# Patient Record
Sex: MALE | Race: WHITE | Employment: OTHER | URBAN - METROPOLITAN AREA
[De-identification: names, ages, dates, MRNs, and addresses within clinical notes are randomized per-mention and may not be internally consistent; named-entity substitution may affect disease eponyms.]

---

## 2017-01-27 ENCOUNTER — HOSPITAL ENCOUNTER (OUTPATIENT)
Dept: LAB | Age: 32
Discharge: HOME | End: 2017-01-27

## 2017-01-27 ENCOUNTER — OFFICE VISIT (OUTPATIENT)
Dept: INTERNAL MEDICINE CLINIC | Age: 32
End: 2017-01-27

## 2017-01-27 VITALS
BODY MASS INDEX: 35.78 KG/M2 | OXYGEN SATURATION: 99 % | DIASTOLIC BLOOD PRESSURE: 74 MMHG | RESPIRATION RATE: 16 BRPM | HEART RATE: 78 BPM | WEIGHT: 270 LBS | SYSTOLIC BLOOD PRESSURE: 127 MMHG | TEMPERATURE: 98.3 F | HEIGHT: 73 IN

## 2017-01-27 DIAGNOSIS — I48.0 PAROXYSMAL ATRIAL FIBRILLATION (HCC): Primary | ICD-10-CM

## 2017-01-27 DIAGNOSIS — J06.9 UPPER RESPIRATORY TRACT INFECTION, UNSPECIFIED TYPE: ICD-10-CM

## 2017-01-27 DIAGNOSIS — G47.9 SLEEP DISORDER: ICD-10-CM

## 2017-01-27 DIAGNOSIS — G89.29 CHRONIC MIDLINE LOW BACK PAIN WITHOUT SCIATICA: ICD-10-CM

## 2017-01-27 DIAGNOSIS — M54.50 CHRONIC MIDLINE LOW BACK PAIN WITHOUT SCIATICA: ICD-10-CM

## 2017-01-27 DIAGNOSIS — E66.9 OBESITY (BMI 30-39.9): ICD-10-CM

## 2017-01-27 PROCEDURE — 99001 SPECIMEN HANDLING PT-LAB: CPT | Performed by: INTERNAL MEDICINE

## 2017-01-27 RX ORDER — NAPROXEN 500 MG/1
500 TABLET ORAL 2 TIMES DAILY WITH MEALS
Qty: 60 TAB | Refills: 1 | Status: SHIPPED | OUTPATIENT
Start: 2017-01-27 | End: 2017-07-27

## 2017-01-27 RX ORDER — ALBUTEROL SULFATE 90 UG/1
2 AEROSOL, METERED RESPIRATORY (INHALATION)
Qty: 1 INHALER | Refills: 0 | Status: SHIPPED | OUTPATIENT
Start: 2017-01-27 | End: 2017-07-27

## 2017-01-27 NOTE — PROGRESS NOTES
Pt presented today to establish care, pt would like a referral for sleep study, pt also reports back pain. Has pt had any falls since last visit? No.  Pt preferred language for health care discussion is english. Advanced Directive? No    Is someone accompanying this pt? No    Is the patient using any DME equipment during OV? No      1. Have you been to the ER, urgent care clinic since your last visit? Hospitalized since your last visit? No    2. Have you seen or consulted any other health care providers outside of the [facility] since your last visit? Include any pap smears or colon screening. No      Pt has a reminder for a \"due or due soon\" health maintenance. I have asked that he contact his primary care provider for follow-up on this health maintenance.

## 2017-01-27 NOTE — MR AVS SNAPSHOT
Visit Information Date & Time Provider Department Dept. Phone Encounter #  
 1/27/2017 12:45 [address] [phone] [id number] Follow-up Instructions Return in about 6 months (around 7/27/2017), or if symptoms worsen or fail to improve. Upcoming Health Maintenance Date Due DTaP/Tdap/Td series (1 - Tdap) 1/5/2007 INFLUENZA AGE 9 TO ADULT 8/1/2016 Allergies as of 1/27/2017  Review Complete On: 1/27/2017 By: Ebonie Whitlock MD  
 No Known Allergies Current Immunizations  Never Reviewed No immunizations on file. Not reviewed this visit You Were Diagnosed With   
  
 Codes Comments Paroxysmal atrial fibrillation (HCC)    -  Primary ICD-10-CM: I48.0 ICD-9-CM: 427.31 Upper respiratory tract infection, unspecified type     ICD-10-CM: J06.9 ICD-9-CM: 465.9 Sleep disorder     ICD-10-CM: G47.9 ICD-9-CM: 780.50 Obesity (BMI 30-39. 9)     ICD-10-CM: E66.9 ICD-9-CM: 278.00 Chronic midline low back pain without sciatica     ICD-10-CM: M54.5, G89.29 ICD-9-CM: 724.2, 338.29 Vitals BP Pulse Temp Resp Height(growth percentile) Weight(growth percentile) 127/74 (BP 1 Location: Left arm, BP Patient Position: Sitting) 78 98.3 °F (36.8 °C) (Oral) 16 6' 1\" (1.854 m) 270 lb (122.5 kg) SpO2 BMI Smoking Status 99% 35.62 kg/m2 Never Smoker Vitals History BMI and BSA Data Body Mass Index Body Surface Area  
 35.62 kg/m 2 2.51 m 2 Preferred Pharmacy Pharmacy Name Phone [address] [place] [address] [phone] Your Updated Medication List  
  
   
This list is accurate as of: 1/27/17  1:12 PM.  Always use your most recent med list.  
  
  
  
  
 albuterol 90 mcg/actuation inhaler Commonly known as:  PROVENTIL HFA, VENTOLIN HFA, PROAIR HFA Take 2 Puffs by inhalation every six (6) hours as needed for Wheezing or Shortness of Breath. naproxen 500 mg tablet Commonly known as:  NAPROSYN Take 1 Tab by mouth two (2) times daily (with meals). Indications: PAIN Prescriptions Sent to Pharmacy Refills  
 albuterol (PROVENTIL HFA, VENTOLIN HFA, PROAIR HFA) 90 mcg/actuation inhaler 0 Sig: Take 2 Puffs by inhalation every six (6) hours as needed for Wheezing or Shortness of Breath. Class: Normal  
 Pharmacy: [address], [facility] Ph #: [phone] Route: Inhalation  
 naproxen (NAPROSYN) 500 mg tablet 1 Sig: Take 1 Tab by mouth two (2) times daily (with meals). Indications: PAIN Class: Normal  
 Pharmacy: [address], [address] [place] [address]. Ph #: [phone] Route: Oral  
  
We Performed the Following SLEEP MEDICINE REFERRAL [QGT834 Custom] Comments:  
 Please evaluate patient for possible JESSICA. H/o a fib, obesity, witness apnea. Follow-up Instructions Return in about 6 months (around 7/27/2017), or if symptoms worsen or fail to improve. To-Do List   
 01/27/2017 Lab:  CBC W/O DIFF   
  
 01/27/2017 Lab:  LIPID PANEL   
  
 01/27/2017 Lab:  METABOLIC PANEL, COMPREHENSIVE   
  
 01/27/2017 Lab:  TSH 3RD GENERATION Referral Information Referral ID Referred By Referred To  
  
 [id number] Della TARANGO Not Available Visits Status Start Date End Date 1 New Request 1/27/17 1/27/18 If your referral has a status of pending review or denied, additional information will be sent to support the outcome of this decision. Introducing [facility]! Paulina Bang introduces [employer] patient portal. Now you can access parts of your medical record, email your doctor's office, and request medication refills online. 1. In your internet browser, go to [email or web address]. [email or web address] 2. Click on the First Time User? Click Here link in the Sign In box.  You will see the New Member Sign Up page. 3. Enter your [employer] Access Code exactly as it appears below. You will not need to use this code after youve completed the sign-up process. If you do not sign up before the expiration date, you must request a new code. · [employer] Access Code: [email or web address] Expires: 4/27/2017  1:12 PM 
 
4. Enter the last four digits of your Social Security Number (xxxx) and Date of Birth (mm/dd/yyyy) as indicated and click Submit. You will be taken to the next sign-up page. 5. Create a [employer] ID. This will be your [employer] login ID and cannot be changed, so think of one that is secure and easy to remember. 6. Create a [employer] password. You can change your password at any time. 7. Enter your Password Reset Question and Answer. This can be used at a later time if you forget your password. 8. Enter your e-mail address. You will receive e-mail notification when new information is available in [facility] 9. Click Sign Up. You can now view and download portions of your medical record. 10. Click the Download Summary menu link to download a portable copy of your medical information. If you have questions, please visit the Frequently Asked Questions section of the [employer] website. Remember, [employer] is NOT to be used for urgent needs. For medical emergencies, dial 911. Now available from your iPhone and Android! Please provide this summary of care documentation to your next provider. Your primary care clinician is listed as Kimberly [address]. If you have any questions after today's visit, please call [phone].

## 2017-01-27 NOTE — PROGRESS NOTES
HISTORY OF PRESENT ILLNESS  Demetria Conrad is a 33 y.o. male. HPI Comments: 32 yo male here to establish care. F/u of a fib, possible JESSICA, chronic back pain, recent URI sx. A fib on prior cardiology evaluation. May-June 2016. Noted two separate episodes of significant palpitations. Was briefly on eliquis, ? Diltiazem but was told he did not need to continue due to low risk. Has not had recent palpiations. [facility] cardiologist did recommended he have sleep study due to possible JESSICA sx. [facility] girlfriend has noted apnea episodes. BMI 35. Has been trying to work on increased exercise, dietary changes. Back pain chronic issue but increased recently. No radicular sx. Aleve prn helped some but does not take regularly. Has had recent cough over past few days. No fevers noted. Some sore throat. Establish Care   Associated symptoms include headaches. Pertinent negatives include no chest pain and no shortness of breath. Back Pain    Associated symptoms include headaches. Pertinent negatives include no chest pain, no fever, no weight loss and no tingling. Review of Systems   Constitutional: Negative for chills, fever and weight loss. HENT: Negative for congestion. Eyes: Negative for blurred vision and pain. Respiratory: Positive for cough. Negative for shortness of breath. Cardiovascular: Negative for chest pain, palpitations and leg swelling. Gastrointestinal: Negative for heartburn, nausea and vomiting. Genitourinary: Negative for frequency and urgency. Musculoskeletal: Positive for back pain. Negative for joint pain and myalgias. Skin: Negative for itching and rash. Neurological: Positive for headaches. Negative for dizziness and tingling. Psychiatric/Behavioral: Negative for depression. The patient is not nervous/anxious. Past Medical History   Diagnosis Date    Atrial fibrillation ([facility] [address]      No current outpatient prescriptions on file prior to visit.      No current facility-administered medications on file prior to visit. Social History     Social History    Marital status: SINGLE     Spouse name: N/A    Number of children: N/A    Years of education: N/A     Occupational History    Not on file. Social History Main Topics    Smoking status: Never Smoker    Smokeless tobacco: Never Used    Alcohol use 1.8 oz/week     3 Cans of beer per week    Drug use: No    Sexual activity: Yes     Other Topics Concern    Not on file     Social History Narrative    No narrative on file     Family History   Problem Relation Age of Onset    No Known Problems Mother     Arrhythmia Father     Diabetes Father     Parkinson's Disease Father     Celiac Disease Sister        Visit Vitals    /74 (BP 1 Location: Left arm, BP Patient Position: Sitting)    Pulse 78    Temp 98.3 °F (36.8 °C) (Oral)    Resp 16    Ht 6' 1\" (1.854 m)    Wt 270 lb (122.5 kg)    SpO2 99%    BMI 35.62 kg/m2     Physical Exam   Constitutional: He appears well-developed and well-nourished. No distress. /74 (BP 1 Location: Left arm, BP Patient Position: Sitting)  Pulse 78  Temp 98.3 °F (36.8 °C) (Oral)   Resp 16  Ht 6' 1\" (1.854 m)  Wt 270 lb (122.5 kg)  SpO2 99%  BMI 35.62 kg/m2     HENT:   Right Ear: External ear normal.   Left Ear: Tympanic membrane is injected. Mouth/Throat: Oropharyngeal exudate (on R) present. Eyes: EOM are normal. Right eye exhibits no discharge. Left eye exhibits no discharge. No scleral icterus. Neck: Neck supple. Cardiovascular: Normal rate, regular rhythm and normal heart sounds. Exam reveals no gallop and no friction rub. No murmur heard. Pulmonary/Chest: Effort normal. No respiratory distress. He has wheezes (expiratory on L). He has no rales. Abdominal: Soft. He exhibits no distension. There is no tenderness. There is no rebound and no guarding. Musculoskeletal: He exhibits no edema or tenderness.         Thoracic back: He exhibits no tenderness, no bony tenderness and no swelling. Lumbar back: He exhibits no tenderness, no bony tenderness and no swelling. Lymphadenopathy:     He has cervical adenopathy. Neurological: He is alert. He exhibits normal muscle tone. Skin: Skin is warm and dry. Psychiatric: He has a normal mood and affect. ASSESSMENT and PLAN    ICD-10-CM ICD-9-CM    1. Paroxysmal atrial fibrillation (HCC) I48.0 427.31 TSH 3RD GENERATION      TSH 3RD GENERATION   2. Upper respiratory tract infection, unspecified type J06.9 465.9    3. Sleep disorder G47.9 780.50 SLEEP MEDICINE REFERRAL   4. Obesity (BMI 30-39. 9) E66.9 278.00 CBC W/O DIFF      LIPID PANEL      METABOLIC PANEL, COMPREHENSIVE      CBC W/O DIFF      LIPID PANEL      METABOLIC PANEL, COMPREHENSIVE   5. Chronic midline low back pain without sciatica M54.5 724.2     G89.29 338.29    Will try to obtain records from his cardiologist. Repeat labs today. Will try albuterol for cough associated with his URI (? Exercise induced RAD as child)  Sleep medicine referral entered. Asked that he work on weight loss. Naprosyn BID ordered for back pain. If sx persist or increase despite this, will return to reassess.

## 2017-01-28 LAB
ALBUMIN SERPL-MCNC: 4.6 G/DL (ref 3.5–5.5)
ALBUMIN/GLOB SERPL: 1.8 {RATIO} (ref 1.1–2.5)
ALP SERPL-CCNC: 70 IU/L (ref 39–117)
ALT SERPL-CCNC: 50 IU/L (ref 0–44)
AST SERPL-CCNC: 32 IU/L (ref 0–40)
BILIRUB SERPL-MCNC: 0.3 MG/DL (ref 0–1.2)
BUN SERPL-MCNC: 12 MG/DL (ref 6–20)
BUN/CREAT SERPL: 14 (ref 8–19)
CALCIUM SERPL-MCNC: 9.5 MG/DL (ref 8.7–10.2)
CHLORIDE SERPL-SCNC: 99 MMOL/L (ref 96–106)
CHOLEST SERPL-MCNC: 201 MG/DL (ref 100–199)
CO2 SERPL-SCNC: 24 MMOL/L (ref 18–29)
CREAT SERPL-MCNC: 0.87 MG/DL (ref 0.76–1.27)
ERYTHROCYTE [DISTWIDTH] IN BLOOD BY AUTOMATED COUNT: 13.3 % (ref 12.3–15.4)
GLOBULIN SER CALC-MCNC: 2.6 G/DL (ref 1.5–4.5)
GLUCOSE SERPL-MCNC: 107 MG/DL (ref 65–99)
HCT VFR BLD AUTO: 43.3 % (ref 37.5–51)
HDLC SERPL-MCNC: 34 MG/DL
HGB BLD-MCNC: 14.8 G/DL (ref 12.6–17.7)
INTERPRETATION, 910389: NORMAL
LDLC SERPL CALC-MCNC: 148 MG/DL (ref 0–99)
MCH RBC QN AUTO: 29.5 PG (ref 26.6–33)
MCHC RBC AUTO-ENTMCNC: 34.2 G/DL (ref 31.5–35.7)
MCV RBC AUTO: 86 FL (ref 79–97)
PLATELET # BLD AUTO: 286 X10E3/UL (ref 150–379)
POTASSIUM SERPL-SCNC: 4.2 MMOL/L (ref 3.5–5.2)
PROT SERPL-MCNC: 7.2 G/DL (ref 6–8.5)
RBC # BLD AUTO: 5.01 X10E6/UL (ref 4.14–5.8)
SODIUM SERPL-SCNC: 139 MMOL/L (ref 134–144)
TRIGL SERPL-MCNC: 93 MG/DL (ref 0–149)
TSH SERPL DL<=0.005 MIU/L-ACNC: 2.26 UIU/ML (ref 0.45–4.5)
VLDLC SERPL CALC-MCNC: 19 MG/DL (ref 5–40)
WBC # BLD AUTO: 5.9 X10E3/UL (ref 3.4–10.8)

## 2017-06-07 ENCOUNTER — OFFICE VISIT (OUTPATIENT)
Dept: NEUROLOGY | Age: 32
End: 2017-06-07

## 2017-06-07 VITALS
SYSTOLIC BLOOD PRESSURE: 120 MMHG | BODY MASS INDEX: 33.27 KG/M2 | TEMPERATURE: 98.6 F | OXYGEN SATURATION: 98 % | DIASTOLIC BLOOD PRESSURE: 62 MMHG | HEART RATE: 60 BPM | WEIGHT: 251 LBS | HEIGHT: 73 IN

## 2017-06-07 DIAGNOSIS — G47.00 INSOMNIA W/ SLEEP APNEA: Primary | ICD-10-CM

## 2017-06-07 DIAGNOSIS — G47.30 INSOMNIA W/ SLEEP APNEA: Primary | ICD-10-CM

## 2017-06-07 DIAGNOSIS — I48.0 PAROXYSMAL ATRIAL FIBRILLATION (HCC): ICD-10-CM

## 2017-06-07 DIAGNOSIS — R06.83 SNORING: ICD-10-CM

## 2017-06-07 NOTE — PROGRESS NOTES
[facility]             [address] [facility], [address] [facility], [address] [place] [address]      Rosalinda Almeida is right handed [facility]  male who presents in evaluation of sleep disorder. Patient describes childhood Onset was gradual over many years. Patient describes occasional difficulty initiating and staying asleep but more daytime sleepiness and loud snoring and witnessed apnea he has snored since childhood he is improved over the last several months due to a 17 pound weight loss he does take approximately 2 long naps a week to an hour each. Goes to bed at 11 falls asleep within 20-30 minutes awakens twice to 3 times for up to 10 or more minutes at least once to urinate. He does not feel rested in the morning he wakes usually at 7 AM but at times up to 9 or 10 AM he admits to occasional sleep hallucinations where nodding off while driving but no accidents snoring mouth breathing witnessed apnea rare restless leg syndrome  Ess=6 stop bang 5/8    Current Outpatient Prescriptions:     albuterol (PROVENTIL HFA, VENTOLIN HFA, PROAIR HFA) 90 mcg/actuation inhaler, Take 2 Puffs by inhalation every six (6) hours as needed for Wheezing or Shortness of Breath., Disp: 1 Inhaler, Rfl: 0    naproxen (NAPROSYN) 500 mg tablet, Take 1 Tab by mouth two (2) times daily (with meals). Indications: PAIN, Disp: 60 Tab, Rfl: 1  Past Medical History:   Diagnosis Date    Atrial fibrillation ([facility] [address]      Social History     Social History    Marital status: SINGLE     Spouse name: N/A    Number of children: N/A    Years of education: N/A     Occupational History    Not on file.      Social History Main Topics    Smoking status: Never Smoker    Smokeless tobacco: Never Used    Alcohol use 1.8 oz/week     3 Cans of beer per week      Comment: weekends 3 drinks nightly    Drug use: No    Sexual activity: Yes     Other Topics Concern    Not on file     Social History Narrative Review of Systems:   Constitutional:  lost,  17 lbs. Eyes:  Negative blurred vision  CVS:  Negative chest pain  Pulm:  Positive shortness of breath  GI:  Negative nausea or vomiting  :  Positive nocturia  Musculoskeletal:  Negative significant joint pain at night  Skin:  Negative rashes  Neuro:  Negative dizziness   Psych:  Negative significant mood issues    Sleep Review of Systems: notable for Positive difficulty falling asleep; Positive awakenings at night; Positive percieved regular dreaming; Negative nightmares; Negative early morning headaches; 2 afternoon naps per week; Negative memory problems; Positive concentration issues; Negative history of any automobile or occupational accidents due to daytime drowsiness. Physical Exam    Visit Vitals    /62    Pulse 60    Temp 98.6 °F (37 °C) (Oral)    Ht 6' 1\" (1.854 m)    Wt 113.9 kg (251 lb)    SpO2 98%    BMI 33.12 kg/m2       Neck Circumference: 42 cm      General:  Well defined, nourished, and groomed individual in no acute distress. HEENT: head :at/nc Throat:oropharnynx  Crowding noted Mallampati 4  Neck: Supple, nontender, thyroid within normal limits, no JVD, no bruits, no pain   with resistance to active range of motion. Heart: Regular rate and rhythm, no murmurs, rub, or gallop. Normal S1S2. Lungs:  Clear to auscultation   Musculoskeletal:  Extremities revealed no edema, clubbing or cyanosis. Psych:  Good mood and normal affect    Neurologic Exam    Mental Status: The patient is awake, alert, and oriented x 3. Speech is fluent and memory appears to be intact, both long and short term. No aphasias or apraxias. Cranial Nerves: II - Visual fields are full to confrontation. Funduscopic examination reveals flat disks bilaterally. Pupils are both equal and reactive to light and accommodation. III, IV, VI - Extraocular movements are intact and there is no nystagmus.    V - Facial sensation is intact to pinprick and light touch.  VII - Face is symmetrical.   VIII - Hearing is present. IX, X - Palate rises symmetrically. Gag is present. Tongue is in the midline. Motor: Tone is normal and symmetric. There is no pronator drift present. The strength is intact for all muscle groups tested in all four extremities. Sensory: Sensory examination is intact to pinprick, light touch and position sense testing. Coordination: Finger to nose, heel to shin, fine motor movements are well performed. Gait testing is normal as well as stressed gait testing. Romberg is negative    Reflexes: Symmetrical  plantars are down going    Assessment and Plan  Dara Redd is a 33 y.o. right handed male whose history and physical are consistent with insomnia with reagan. Dara Redd who has risk factors including a fib ,witnessed apnea,snoring? RLS    Nanda Linda was seen today for sleep problem. Diagnoses and all orders for this visit:    Insomnia w/ sleep apnea  -     SLEEP STUDY FULL (99092); Future    Snoring  -     SLEEP STUDY FULL (13443); Future    Paroxysmal atrial fibrillation ([facility] [address]  -     SLEEP STUDY FULL (43264); Future    BMI 33.0-33.9,adult  -     SLEEP STUDY FULL (93439); Future      Follow-up Disposition:  Return in about 1 month (around 7/7/2017). Reviewed work up planned ,no driving drowsyI spent 50 minutes with the patient in face-to-face consultation, of which greater than 50% was spent in counseling and coordination of care as described above.         Electronically Signed by:  Liz Sheridan MD

## 2017-06-07 NOTE — MR AVS SNAPSHOT
Visit Information Date & Time Provider Department Dept. Phone Encounter #  
 6/7/2017  2:00 PM Debi Lugo, [address] [phone] [id number] Follow-up Instructions Return in about 1 month (around 7/7/2017). Follow-up and Disposition History Your Appointments 7/27/2017 11:15 AM  
Office Visit with MD RADHA [facility]) Appt Note: 6 month f/u  
 [address]  
  
   
 [address] [facility] Upcoming Health Maintenance Date Due DTaP/Tdap/Td series (1 - Tdap) 1/5/2007 INFLUENZA AGE 9 TO ADULT 8/1/2017 Allergies as of 6/7/2017  Review Complete On: 6/7/2017 By: Debi Lugo MD  
 No Known Allergies Current Immunizations  Never Reviewed No immunizations on file. Not reviewed this visit You Were Diagnosed With   
  
 Codes Comments Insomnia w/ sleep apnea    -  Primary ICD-10-CM: G47.00, G47.30 ICD-9-CM: 780.51 Snoring     ICD-10-CM: R06.83 
ICD-9-CM: 786.09 Paroxysmal atrial fibrillation (HCC)     ICD-10-CM: I48.0 ICD-9-CM: 427.31   
 BMI 33.0-33.9,adult     ICD-10-CM: C55.32 
ICD-9-CM: V85.33 Vitals BP Pulse Temp Height(growth percentile) Weight(growth percentile) SpO2  
 120/62 60 98.6 °F (37 °C) (Oral) 6' 1\" (1.854 m) 251 lb (113.9 kg) 98% BMI Smoking Status 33.12 kg/m2 Never Smoker BMI and BSA Data Body Mass Index Body Surface Area  
 33.12 kg/m 2 2.42 m 2 Preferred Pharmacy Pharmacy Name Phone [address] [facility], [address] [place] [address] [phone] Your Updated Medication List  
  
   
This list is accurate as of: 6/7/17  3:14 PM.  Always use your most recent med list.  
  
  
  
  
 albuterol 90 mcg/actuation inhaler Commonly known as:  PROVENTIL HFA, VENTOLIN HFA, PROAIR HFA  
 Take 2 Puffs by inhalation every six (6) hours as needed for Wheezing or Shortness of Breath. naproxen 500 mg tablet Commonly known as:  NAPROSYN Take 1 Tab by mouth two (2) times daily (with meals). Indications: PAIN Follow-up Instructions Return in about 1 month (around 7/7/2017). To-Do List   
 06/07/2017 Sleep Center:  SLEEP STUDY FULL Patient Instructions No driving drowsy Introducing [facility]! Dear Michelle Marquez: Thank you for requesting a [employer] account. Our records indicate that you already have an active [employer] account. You can access your account anytime at [email or web address] Did you know that you can access your hospital and ER discharge instructions at any time in [employer]? You can also review all of your test results from your hospital stay or ER visit. Additional Information If you have questions, please visit the Frequently Asked Questions section of the [employer] website at [email or web address] Remember, [employer] is NOT to be used for urgent needs. For medical emergencies, dial 911. Now available from your iPhone and Android! Please provide this summary of care documentation to your next provider. Your primary care clinician is listed as Kimberly Damon. If you have any questions after today's visit, please call [phone].

## 2017-06-22 ENCOUNTER — HOSPITAL ENCOUNTER (OUTPATIENT)
Dept: SLEEP MEDICINE | Age: 32
Discharge: HOME | End: 2017-06-22
Payer: COMMERCIAL

## 2017-06-22 DIAGNOSIS — G47.00 INSOMNIA W/ SLEEP APNEA: ICD-10-CM

## 2017-06-22 DIAGNOSIS — R06.83 SNORING: ICD-10-CM

## 2017-06-22 DIAGNOSIS — G47.30 INSOMNIA W/ SLEEP APNEA: ICD-10-CM

## 2017-06-22 DIAGNOSIS — I48.0 PAROXYSMAL ATRIAL FIBRILLATION (HCC): ICD-10-CM

## 2017-06-22 PROCEDURE — 95810 POLYSOM 6/> YRS 4/> PARAM: CPT

## 2017-06-23 VITALS — DIASTOLIC BLOOD PRESSURE: 86 MMHG | HEART RATE: 62 BPM | SYSTOLIC BLOOD PRESSURE: 133 MMHG

## 2017-06-29 NOTE — PROCEDURES
[address]  POLYSOMNOGRAM    Name:  Jennifer Carlos  MR#:  [id number]  :  1985  Account #:  [de-identified]  Date of Adm:  2017  Date of Service:  2017      PROCEDURE:  Polysomnographic study. REFERRING PHYSICIAN:  Nikunj Waldron. Roberta Jo MD    INTERRUPTING/READING PHYSICIAN:  John Tyler. Yoseph Pritchard MD    STUDY:  This is a 33year old with BMI of 33.8 who presents for  polysomnographic study due to suspected sleep apnea. He  reports [place] Sleepiness Score of 8. STOP-Band score was 6/8. Occasional PVCs were noted. He reports being on no medication. He  did not take sedation prior to study. He denies sleep hygiene protocol  violations. Blood pressure prior to study 134/78, post-study 133/86. Study was performed as a diagnostic study. Total recording time was  486 minutes, total sleep time 314, for a decreased sleep efficiency of  74%. This was in large part due to prolonged wake time after sleep  onset of 114 minutes. Sleep latency was normal at 12.5  minutes. REM latency was prolonged at 280 minutes. On arousal  index it was elevated at 16, primarily nonspecific arousals. On review  of sleep architecture, he did enter into all stages of sleep. On  respiratory review, he had 1 obstructive apnea, he had 25 hypopneas. Overall apnea/hypopnea index was in the mild range at 5, however,  became moderately severe in REM at 27. Supine sleep  apnea/hypopnea index was 5 as well. Snoring was much worse  supine where he slept the majority of time. On review of oxygen  saturation, mean oxygen saturation in non-REM was 94, in REM was  93; minimum was 74 in non-REM and 80 in REM. He spent 2.7  minutes below 88% O2 saturation. Mean heart rate was 63 in non-  REM, 64 in REM; minimum heart rate was 45 in non-REM, 46 in REM;  maximum was 97 in non-REM, 85 in REM. Periodic movements of  sleep index was 2.7. Periodic movements of sleep with arousal index  was 0.     CONCLUSION:  The patient's polysomnographic study showed  evidence of overall mild obstructive sleep apnea, which became  moderately severe in REM sleep with oxygen desaturations down to  74%. PVCs were also noted. Suggest repeat study with CPAP. The  patient should not drive if drowsy.         MD Giovana Denis / Gio Myrick  D:  06/28/2017   15:46  T:  06/29/2017   13:27  Job #:  [id number]

## 2017-07-27 ENCOUNTER — OFFICE VISIT (OUTPATIENT)
Dept: INTERNAL MEDICINE CLINIC | Age: 32
End: 2017-07-27

## 2017-07-27 ENCOUNTER — OFFICE VISIT (OUTPATIENT)
Dept: NEUROLOGY | Age: 32
End: 2017-07-27

## 2017-07-27 VITALS
TEMPERATURE: 98.2 F | SYSTOLIC BLOOD PRESSURE: 110 MMHG | DIASTOLIC BLOOD PRESSURE: 72 MMHG | BODY MASS INDEX: 33.9 KG/M2 | HEIGHT: 73 IN | WEIGHT: 255.8 LBS | OXYGEN SATURATION: 98 % | HEART RATE: 71 BPM

## 2017-07-27 VITALS
OXYGEN SATURATION: 99 % | DIASTOLIC BLOOD PRESSURE: 82 MMHG | TEMPERATURE: 98.3 F | WEIGHT: 253.6 LBS | BODY MASS INDEX: 33.61 KG/M2 | SYSTOLIC BLOOD PRESSURE: 135 MMHG | HEIGHT: 73 IN | HEART RATE: 52 BPM | RESPIRATION RATE: 16 BRPM

## 2017-07-27 DIAGNOSIS — I48.0 PAROXYSMAL ATRIAL FIBRILLATION (HCC): Primary | ICD-10-CM

## 2017-07-27 DIAGNOSIS — G47.33 OSA (OBSTRUCTIVE SLEEP APNEA): Primary | ICD-10-CM

## 2017-07-27 DIAGNOSIS — G47.10 HYPERSOMNIA WITH SLEEP APNEA: ICD-10-CM

## 2017-07-27 DIAGNOSIS — Z23 ENCOUNTER FOR IMMUNIZATION: ICD-10-CM

## 2017-07-27 DIAGNOSIS — G47.30 INSOMNIA W/ SLEEP APNEA: ICD-10-CM

## 2017-07-27 DIAGNOSIS — R06.83 SNORING: ICD-10-CM

## 2017-07-27 DIAGNOSIS — E66.9 OBESITY (BMI 30.0-34.9): ICD-10-CM

## 2017-07-27 DIAGNOSIS — I48.0 PAROXYSMAL ATRIAL FIBRILLATION (HCC): ICD-10-CM

## 2017-07-27 DIAGNOSIS — G47.00 INSOMNIA W/ SLEEP APNEA: ICD-10-CM

## 2017-07-27 DIAGNOSIS — G47.30 HYPERSOMNIA WITH SLEEP APNEA: ICD-10-CM

## 2017-07-27 NOTE — MR AVS SNAPSHOT
Visit Information Date & Time Provider Department Dept. Phone Encounter #  
 7/27/2017 11:15 AM Cem Ramires [facility] [phone] Follow-up Instructions Return in about 1 year (around 7/27/2018), or if symptoms worsen or fail to improve. Your Appointments 7/27/2017  3:30 PM  
Follow Up with Rigo James MD  
[facility]) [address] [place] [address] [phone]  
  
   
 [place] [id number] Upcoming Health Maintenance Date Due INFLUENZA AGE 9 TO ADULT 8/1/2017 DTaP/Tdap/Td series (2 - Td) 7/27/2027 Allergies as of 7/27/2017  Review Complete On: 7/27/2017 By: Yayo Munoz LPN No Known Allergies Current Immunizations  Never Reviewed Name Date Tdap  Incomplete Not reviewed this visit You Were Diagnosed With   
  
 Codes Comments Paroxysmal atrial fibrillation (HCC)    -  Primary ICD-10-CM: I48.0 ICD-9-CM: 427.31 Insomnia w/ sleep apnea     ICD-10-CM: G47.00, G47.30 ICD-9-CM: 780.51 Obesity (BMI 30.0-34.9)     ICD-10-CM: D29.7 ICD-9-CM: 278.00 Encounter for immunization     ICD-10-CM: O88 ICD-9-CM: V03.89 Vitals BP Pulse Temp Resp Height(growth percentile) Weight(growth percentile) 135/82 (BP 1 Location: Left arm, BP Patient Position: Sitting) (!) 52 98.3 °F (36.8 °C) (Oral) 16 6' 1\" (1.854 m) 253 lb 9.6 oz (115 kg) SpO2 BMI Smoking Status 99% 33.46 kg/m2 Never Smoker Vitals History BMI and BSA Data Body Mass Index Body Surface Area  
 33.46 kg/m 2 2.43 m 2 Preferred Pharmacy Pharmacy Name Phone 1500 [facility], [address] [place] [address] [phone] Your Updated Medication List  
  
Notice  As of 7/27/2017 11:37 AM  
 You have not been prescribed any medications. We Performed the Following TETANUS, DIPHTHERIA TOXOIDS AND ACELLULAR PERTUSSIS VACCINE (TDAP), IN INDIVIDS. >=7, IM [id number] CPT(R)] Follow-up Instructions Return in about 1 year (around 7/27/2018), or if symptoms worsen or fail to improve. Patient Instructions Body Mass Index: Care Instructions Your Care Instructions Body mass index (BMI) can help you see if your weight is raising your risk for health problems. It uses a formula to compare how much you weigh with how tall you are. · A BMI lower than 18.5 is considered underweight. · A BMI between 18.5 and 24.9 is considered healthy. · A BMI between 25 and 29.9 is considered overweight. A BMI of 30 or higher is considered obese. If your BMI is in the normal range, it means that you have a lower risk for weight-related health problems. If your BMI is in the overweight or obese range, you may be at increased risk for weight-related health problems, such as high blood pressure, heart disease, stroke, arthritis or joint pain, and diabetes. If your BMI is in the underweight range, you may be at increased risk for health problems such as fatigue, lower protection (immunity) against illness, muscle loss, bone loss, hair loss, and hormone problems. BMI is just one measure of your risk for weight-related health problems. You may be at higher risk for health problems if you are not active, you eat an unhealthy diet, or you drink too much alcohol or use tobacco products. Follow-up care is a key part of your treatment and safety. Be sure to make and go to all appointments, and call your doctor if you are having problems. It's also a good idea to know your test results and keep a list of the medicines you take. How can you care for yourself at home? · Practice healthy eating habits. This includes eating plenty of fruits, vegetables, whole grains, lean protein, and low-fat dairy. · If your doctor recommends it, get more exercise. Walking is a good choice. Bit by bit, increase the amount you walk every day. Try for at least 30 minutes on most days of the week. · Do not smoke. Smoking can increase your risk for health problems. If you need help quitting, talk to your doctor about stop-smoking programs and medicines. These can increase your chances of quitting for good. · Limit alcohol to 2 drinks a day for men and 1 drink a day for women. Too much alcohol can cause health problems. If you have a BMI higher than 25 · Your doctor may do other tests to check your risk for weight-related health problems. This may include measuring the distance around your waist. A waist measurement of more than 40 inches in men or 35 inches in women can increase the risk of weight-related health problems. · Talk with your doctor about steps you can take to stay healthy or improve your health. You may need to make lifestyle changes to lose weight and stay healthy, such as changing your diet and getting regular exercise. If you have a BMI lower than 18.5 · Your doctor may do other tests to check your risk for health problems. · Talk with your doctor about steps you can take to stay healthy or improve your health. You may need to make lifestyle changes to gain or maintain weight and stay healthy, such as getting more healthy foods in your diet and doing exercises to build muscle. Where can you learn more? Go to http://markel-latrice.info/. Enter S176 in the search box to learn more about \"Body Mass Index: Care Instructions. \" Current as of: January 23, 2017 Content Version: 11.3 © 9871-1989 Jo[employer]. Care instructions adapted under license by [employer] (which disclaims liability or warranty for this information).  If you have questions about a medical condition or this instruction, always ask your healthcare professional. Felicia Jain[employer] disclaims any warranty or liability for your use of this information. Introducing [facility]! Dear Evalene Cooks: Thank you for requesting a Webs account. Our records indicate that you already have an active Webs account. You can access your account anytime at [email or web address] Did you know that you can access your hospital and ER discharge instructions at any time in Webs? You can also review all of your test results from your hospital stay or ER visit. Additional Information If you have questions, please visit the Frequently Asked Questions section of the Webs website at [email or web address] Remember, Webs is NOT to be used for urgent needs. For medical emergencies, dial 911. Now available from your iPhone and Android! Please provide this summary of care documentation to your next provider. Your primary care clinician is listed as Kimberly Damon. If you have any questions after today's visit, please call [phone].

## 2017-07-27 NOTE — MR AVS SNAPSHOT
Visit Information Date & Time Provider Department Dept. Phone Encounter #  
 7/27/2017  3:30 PM Charanjit Gerard MD [address] [phone] [id number] Follow-up Instructions Return in about 1 month (around 8/27/2017). Follow-up and Disposition History Your Appointments 9/13/2017  2:45 PM  
Follow Up with Charanjit Gerard MD  
[address] at [address] [facility] 3651 [facility]) Appt Note: 1 month fu  
 [address] [place] [address] 630 [facility] [address] [facility] [address] Upcoming Health Maintenance Date Due INFLUENZA AGE 9 TO ADULT 8/1/2017 DTaP/Tdap/Td series (2 - Td) 7/27/2027 Allergies as of 7/27/2017  Review Complete On: 7/27/2017 By: Charanjit Gerard MD  
 No Known Allergies Current Immunizations  Never Reviewed Name Date Tdap 7/27/2017 Not reviewed this visit You Were Diagnosed With   
  
 Codes Comments JESSICA (obstructive sleep apnea)    -  Primary ICD-10-CM: G47.33 
ICD-9-CM: 327.23 Paroxysmal atrial fibrillation (HCC)     ICD-10-CM: I48.0 ICD-9-CM: 427.31 Hypersomnia with sleep apnea     ICD-10-CM: G47.10, G47.30 ICD-9-CM: 780.53 Insomnia w/ sleep apnea     ICD-10-CM: G47.00, G47.30 ICD-9-CM: 780.51 Snoring     ICD-10-CM: R06.83 
ICD-9-CM: 786.09 BMI 33.0-33.9,adult     ICD-10-CM: R04.33 
ICD-9-CM: V85.33 Vitals BP Pulse Temp Height(growth percentile) Weight(growth percentile) SpO2  
 110/72 71 98.2 °F (36.8 °C) (Oral) 6' 1\" (1.854 m) 255 lb 12.8 oz (116 kg) 98% BMI Smoking Status 33.75 kg/m2 Never Smoker BMI and BSA Data Body Mass Index Body Surface Area 33.75 kg/m 2 2.44 m 2 Preferred Pharmacy Pharmacy Name Phone [address] [place] [address] [phone] Your Updated Medication List  
  
 Notice  As of 7/27/2017  4:11 PM  
 You have not been prescribed any medications. Follow-up Instructions Return in about 1 month (around 8/27/2017). To-Do List   
 07/27/2017 Sleep Center:  POLYSOMNOGRAPHY (CPAP) Patient Instructions No driving drowsy Introducing [address]! Dear Harrison Garcia: Thank you for requesting a Glider account. Our records indicate that you already have an active Glider account. You can access your account anytime at [email or web address] Did you know that you can access your hospital and ER discharge instructions at any time in Glider? You can also review all of your test results from your hospital stay or ER visit. Additional Information If you have questions, please visit the Frequently Asked Questions section of the Glider website at [email or web address] Remember, Glider is NOT to be used for urgent needs. For medical emergencies, dial 911. Now available from your iPhone and Android! Please provide this summary of care documentation to your next provider. Your primary care clinician is listed as Kimberly Damon. If you have any questions after today's visit, please call [phone].

## 2017-07-27 NOTE — PATIENT INSTRUCTIONS
Body Mass Index: Care Instructions  Your Care Instructions    Body mass index (BMI) can help you see if your weight is raising your risk for health problems. It uses a formula to compare how much you weigh with how tall you are. · A BMI lower than 18.5 is considered underweight. · A BMI between 18.5 and 24.9 is considered healthy. · A BMI between 25 and 29.9 is considered overweight. A BMI of 30 or higher is considered obese. If your BMI is in the normal range, it means that you have a lower risk for weight-related health problems. If your BMI is in the overweight or obese range, you may be at increased risk for weight-related health problems, such as high blood pressure, heart disease, stroke, arthritis or joint pain, and diabetes. If your BMI is in the underweight range, you may be at increased risk for health problems such as fatigue, lower protection (immunity) against illness, muscle loss, bone loss, hair loss, and hormone problems. BMI is just one measure of your risk for weight-related health problems. You may be at higher risk for health problems if you are not active, you eat an unhealthy diet, or you drink too much alcohol or use tobacco products. Follow-up care is a key part of your treatment and safety. Be sure to make and go to all appointments, and call your doctor if you are having problems. It's also a good idea to know your test results and keep a list of the medicines you take. How can you care for yourself at home? · Practice healthy eating habits. This includes eating plenty of fruits, vegetables, whole grains, lean protein, and low-fat dairy. · If your doctor recommends it, get more exercise. Walking is a good choice. Bit by bit, increase the amount you walk every day. Try for at least 30 minutes on most days of the week. · Do not smoke. Smoking can increase your risk for health problems. If you need help quitting, talk to your doctor about stop-smoking programs and medicines. These can increase your chances of quitting for good. · Limit alcohol to 2 drinks a day for men and 1 drink a day for women. Too much alcohol can cause health problems. If you have a BMI higher than 25  · Your doctor may do other tests to check your risk for weight-related health problems. This may include measuring the distance around your waist. A waist measurement of more than 40 inches in men or 35 inches in women can increase the risk of weight-related health problems. · Talk with your doctor about steps you can take to stay healthy or improve your health. You may need to make lifestyle changes to lose weight and stay healthy, such as changing your diet and getting regular exercise. If you have a BMI lower than 18.5  · Your doctor may do other tests to check your risk for health problems. · Talk with your doctor about steps you can take to stay healthy or improve your health. You may need to make lifestyle changes to gain or maintain weight and stay healthy, such as getting more healthy foods in your diet and doing exercises to build muscle. Where can you learn more? Go to http://markel-latrice.info/. Enter S176 in the search box to learn more about \"Body Mass Index: Care Instructions. \"  Current as of: January 23, 2017  Content Version: 11.3  © 8323-1958 eBioscience, Incorporated. Care instructions adapted under license by Way[employer] (which disclaims liability or warranty for this information). If you have questions about a medical condition or this instruction, always ask your healthcare professional. [place] [address] any warranty or liability for your use of this information.

## 2017-07-27 NOTE — PROGRESS NOTES
Pt presents today for F/U Sleep problem. Pt preferred language for health care discussion is english. Is someone accompanying this pt? no    Is the patient using any DME equipment during OV? no    Depression Screening completed. no    Health Maintenance reviewed and discussed per provider. Yes    Advance Directive:  1. Do you have an advance directive in place? Patient Reply: No    Coordination of Care:  1. Have you been to the ER, urgent care clinic since your last visit? Hospitalized since your last visit? No    2. Have you seen or consulted any other health care providers outside of the [address] [place] [address] since your last visit? Include any pap smears or colon screening.  No

## 2017-07-27 NOTE — PROGRESS NOTES
[facility]             [address] [facility], [address]      Tomasz Rider is right handed [facility]  male who presents in evaluation of sleep disorder. Patient describes childhood Onset was gradual over many years. Patient describes occasional difficulty initiating and staying asleep but more daytime sleepiness and loud snoring and witnessed apnea he has snored since childhood he is improved over the last several months due to a 17 pound weight loss he does take approximately 2 long naps a week to an hour each. Goes to bed at 11 falls asleep within 20-30 minutes awakens twice to 3 times for up to 10 or more minutes at least once to urinate. He does not feel rested in the morning he wakes usually at 7 AM but at times up to 9 or 10 AM he admits to occasional sleep hallucinations where nodding off while driving but no accidents snoring mouth breathing witnessed apnea rare restless leg syndrome  Ess=11 stop bang 5/8  No current outpatient prescriptions on file. Past Medical History:   Diagnosis Date    Atrial fibrillation [facility])      Social History     Social History    Marital status: SINGLE     Spouse name: N/A    Number of children: N/A    Years of education: N/A     Occupational History    Not on file. Social History Main Topics    Smoking status: Never Smoker    Smokeless tobacco: Never Used    Alcohol use 1.8 oz/week     3 Cans of beer per week      Comment: weekends 3 drinks nightly    Drug use: No    Sexual activity: Yes     Other Topics Concern    Not on file     Social History Narrative       Review of Systems:   Constitutional:  lost,  17 lbs.    Eyes:  Negative blurred vision  CVS:  Negative chest pain  Pulm:  Positive shortness of breath  GI:  Negative nausea or vomiting  :  Positive nocturia  Musculoskeletal:  Negative significant joint pain at night  Skin:  Negative rashes  Neuro:  Negative dizziness Psych:  Negative significant mood issues    Sleep Review of Systems: notable for Positive difficulty falling asleep; Positive awakenings at night; Positive percieved regular dreaming; Negative nightmares; Negative early morning headaches; 2 afternoon naps per week; Negative memory problems; Positive concentration issues; Negative history of any automobile or occupational accidents due to daytime drowsiness. Physical Exam    Visit Vitals    /72    Pulse 71    Temp 98.2 °F (36.8 °C) (Oral)    Ht 6' 1\" (1.854 m)    Wt 116 kg (255 lb 12.8 oz)    SpO2 98%    BMI 33.75 kg/m2       Neck Circumference: 42 cm      General:  Well defined, nourished, and groomed individual in no acute distress. HEENT: head :at/nc Throat:oropharynx  Crowding noted Mallampati 4  Neck: Supple, non tender, thyroid within normal limits, no ANDREZ, no bruits, no pain   with resistance to active range of motion. Heart: Regular rate and rhythm, no murmurs, rub, or gallop. Normal S.D..  Lungs:  Clear to auscultation   Musculoskeletal:  Extremities revealed no edema, clubbing or cyanosis. Psych:  Good mood and normal affect    Neurologic Exam    Mental Status: The patient is awake, alert, and oriented x 3. Speech is fluent and memory appears to be intact, both long and short term. No aphasias or apraxias. Cranial Nerves: II - Visual fields are full to confrontation. Pupils are both equal and reactive to light and accommodation. III, IV, VI - Extraocular movements are intact and there is no nystagmus. V - Facial sensation is intact to pinprick and light touch. VII - Face is symmetrical.   VIII - Hearing is present. Motor: Tone is normal and symmetric. There is no pronator drift present. The strength is intact for all muscle groups tested in all four extremities. Coordination.  Gait testing is normal       psg reviewed as showing overall mild JESSICA becoming moderately severe in REM sleep with oxygen desaturations down to 74%  Assessment and Plan  Madhu Lockwood is a 33 y.o. right handed male whose history and physical are consistent with insomnia with reagan. Madhu Lockwood who has risk factors including a fib ,witnessed apnea,snoring? RLS    Diagnoses and all orders for this visit:    1. REAGAN (obstructive sleep apnea)  -     CPAP (81438); Future    2. Paroxysmal atrial fibrillation (HCC)  -     CPAP (87681); Future    3. Hypersomnia with sleep apnea  -     CPAP (65370); Future    4. Insomnia w/ sleep apnea  -     CPAP (55346); Future    5. Snoring  -     CPAP (19805); Future    6. BMI 33.0-33.9,adult  -     CPAP (65329); Future      Follow-up Disposition:  Return in about 1 month (around 8/27/2017). Reviewed psg   Reviewed work up planned ,no driving drowsy I spent 30 minutes with the patient in face-to-face consultation, of which greater than 50% was spent in counseling and coordination of care as described above.         Electronically Signed by:  Lesly Padilla MD

## 2017-07-27 NOTE — PROGRESS NOTES
HISTORY OF PRESENT ILLNESS  Ayah Martinez is a 33 y.o. male. HPI Comments: 32 yo male here for f/u of PAF, possible JESSICA. Has not noted sx of a fib. One episode of elevated HR with hiking which resolved with rest. Able to complete half marathon since last visit. Has lost weight (17 lbs since last visit) with lifestyle changes. Had PSG; f/u scheduled today regarding results. Not currently on medication. Review of Systems   Constitutional: Negative for chills, fever and weight loss. HENT: Negative for congestion. Eyes: Negative for blurred vision and pain. Respiratory: Negative for cough and shortness of breath. Cardiovascular: Negative for chest pain, palpitations and leg swelling. Gastrointestinal: Negative for heartburn, nausea and vomiting. Genitourinary: Negative for frequency and urgency. Musculoskeletal: Negative for joint pain and myalgias. Neurological: Negative for dizziness, tingling and headaches. Psychiatric/Behavioral: Negative for depression. The patient is not nervous/anxious. Past Medical History:   Diagnosis Date    Atrial fibrillation ([facility] [address]      No current outpatient prescriptions on file prior to visit. No current facility-administered medications on file prior to visit. Social History   Substance Use Topics    Smoking status: Never Smoker    Smokeless tobacco: Never Used    Alcohol use 1.8 oz/week     3 Cans of beer per week      Comment: weekends 3 drinks nightly     Physical Exam   Constitutional: He appears well-developed and well-nourished. No distress. /82 (BP 1 Location: Left arm, BP Patient Position: Sitting)  Pulse (!) 52  Temp 98.3 °F (36.8 °C) (Oral)   Resp 16  Ht 6' 1\" (1.854 m)  Wt 253 lb 9.6 oz (115 kg)  SpO2 99%  BMI 33.46 kg/m2     Eyes: EOM are normal. Right eye exhibits no discharge. Left eye exhibits no discharge. No scleral icterus. Neck: Neck supple.    Cardiovascular: Normal rate, regular rhythm and normal heart sounds. Exam reveals no gallop and no friction rub. No murmur heard. Pulmonary/Chest: Effort normal and breath sounds normal. No respiratory distress. He has no wheezes. He has no rales. Musculoskeletal: He exhibits no edema or tenderness. Lymphadenopathy:     He has no cervical adenopathy. Neurological: He is alert. He exhibits normal muscle tone. Skin: Skin is warm and dry. Psychiatric: He has a normal mood and affect. Lab Results   Component Value Date/Time    Sodium 139 01/27/2017 01:16 PM    Potassium 4.2 01/27/2017 01:16 PM    Chloride 99 01/27/2017 01:16 PM    CO2 24 01/27/2017 01:16 PM    Glucose 107 01/27/2017 01:16 PM    BUN 12 01/27/2017 01:16 PM    Creatinine 0.87 01/27/2017 01:16 PM    BUN/Creatinine ratio 14 01/27/2017 01:16 PM    GFR est  01/27/2017 01:16 PM    GFR est non- 01/27/2017 01:16 PM    Calcium 9.5 01/27/2017 01:16 PM    Bilirubin, total 0.3 01/27/2017 01:16 PM    AST (SGOT) 32 01/27/2017 01:16 PM    Alk. phosphatase 70 01/27/2017 01:16 PM    Protein, total 7.2 01/27/2017 01:16 PM    Albumin 4.6 01/27/2017 01:16 PM    A-G Ratio 1.8 01/27/2017 01:16 PM    ALT (SGPT) 50 01/27/2017 01:16 PM     Lab Results   Component Value Date/Time    Cholesterol, total 201 01/27/2017 01:16 PM    HDL Cholesterol 34 01/27/2017 01:16 PM    LDL, calculated 148 01/27/2017 01:16 PM    VLDL, calculated 19 01/27/2017 01:16 PM    Triglyceride 93 01/27/2017 01:16 PM     ASSESSMENT and PLAN    ICD-10-CM ICD-9-CM    1. Paroxysmal atrial fibrillation (HCC) I48.0 427.31    2. Insomnia w/ sleep apnea G47.00 780.51     G47.30     3. Obesity (BMI 30.0-34. 9) E66.9 278.00    4. Encounter for immunization Z23 V03.89 TETANUS, DIPHTHERIA TOXOIDS AND ACELLULAR PERTUSSIS VACCINE (TDAP), IN INDIVIDS. >=7, IM   Continue to monitor. He will let me know if new cardiology referral is needed for f/u. F/u as planned regarding sleep study. TDap today.

## 2017-09-06 ENCOUNTER — TELEPHONE (OUTPATIENT)
Dept: NEUROLOGY | Age: 32
End: 2017-09-06

## 2017-09-06 DIAGNOSIS — G47.33 OSA (OBSTRUCTIVE SLEEP APNEA): Primary | ICD-10-CM

## 2017-09-07 NOTE — PROGRESS NOTES
Patient informed of auto pap order and to expect a call from [facility], as I am sending his order to them. Patient states he is scheduled for sleep study for tonight. Advised patient to call sleep center to cancel d/t insurance issues. Informed patient that when he comes to his appointment with Dr. Laura Young on 10/20/17, he will need his usage report from [facility]. Also informed him to call office a couple days before appointment to make sure [facility] has faxed report to us. Patient verbalized understanding.

## 2017-09-07 NOTE — TELEPHONE ENCOUNTER
Called patient and told him Dr. Medrano Senate instructions. and about autoCPAP order. The order is being faxed to [facility] today and they will contact him with further instructions. Patient called back stating that per his insurance they would cover the sleep study. I called Chrystie Phalen in the sleep lab- she states they will not cover the titration and want the part to be on a auto device. I called Mr. Bryn Mckeon back and informed him about the information above. He is aware that the order is being sent to [facility] today.

## 2017-09-08 ENCOUNTER — DOCUMENTATION ONLY (OUTPATIENT)
Dept: NEUROLOGY | Age: 32
End: 2017-09-08

## 2017-09-08 NOTE — PROGRESS NOTES
Received a call from [facility] stating that this patient's titration study request was denied by the reviewing physician. It was determined to not be medically necessarily. A copy will be sent to the patient.

## 2017-09-19 ENCOUNTER — TELEPHONE (OUTPATIENT)
Dept: NEUROLOGY | Age: 32
End: 2017-09-19

## 2017-09-20 ENCOUNTER — TELEPHONE (OUTPATIENT)
Dept: NEUROLOGY | Age: 32
End: 2017-09-20

## 2017-10-18 ENCOUNTER — TELEPHONE (OUTPATIENT)
Dept: NEUROLOGY | Age: 32
End: 2017-10-18

## 2017-10-20 ENCOUNTER — OFFICE VISIT (OUTPATIENT)
Dept: NEUROLOGY | Age: 32
End: 2017-10-20

## 2017-10-20 VITALS
WEIGHT: 257 LBS | DIASTOLIC BLOOD PRESSURE: 80 MMHG | HEART RATE: 52 BPM | SYSTOLIC BLOOD PRESSURE: 140 MMHG | HEIGHT: 73 IN | RESPIRATION RATE: 16 BRPM | OXYGEN SATURATION: 98 % | TEMPERATURE: 98.1 F | BODY MASS INDEX: 34.06 KG/M2

## 2017-10-20 DIAGNOSIS — G47.30 INSOMNIA W/ SLEEP APNEA: ICD-10-CM

## 2017-10-20 DIAGNOSIS — G47.00 INSOMNIA W/ SLEEP APNEA: ICD-10-CM

## 2017-10-20 DIAGNOSIS — G47.33 OSA (OBSTRUCTIVE SLEEP APNEA): Primary | ICD-10-CM

## 2017-10-20 DIAGNOSIS — I48.0 PAROXYSMAL ATRIAL FIBRILLATION (HCC): ICD-10-CM

## 2017-10-20 DIAGNOSIS — R06.83 SNORING: ICD-10-CM

## 2017-10-20 NOTE — PROGRESS NOTES
[facility]             [address] [place] [address], [address] [place] [address]      Demetria Conrad is right handed [facility]  male who presents in evaluation of sleep disorder. Patient describes childhood Onset was gradual over many years. Patient describes occasional difficulty initiating and staying asleep but more daytime sleepiness and loud snoring and witnessed apnea he has snored since childhood he is improved over the last several months due to a 17 pound weight loss he does take approximately 2 long naps a week to an hour each. Goes to bed at 11 falls asleep within 20-30 minutes awakens twice to 3 times for up to 10 or more minutes at least once to urinate. He does not feel rested in the morning he wakes usually at 7 AM but at times up to 9 or 10 AM he admits to occasional sleep hallucinations where nodding off while driving but no accidents snoring mouth breathing witnessed apnea rare restless leg syndrome    Patient reports doing well using the machine on average 6 hours a night he wishes to change masks since he says when he is congested the nasal mask is not helpful because he opens his mouth. Download reviewed as showing usage over 24 hours at 83% usage 100% of the time and apnea hypopnea index of 2.1 average pressure 8.4 maximum 12 Ess=1/24 stop bang 5/8  No current outpatient prescriptions on file. Past Medical History:   Diagnosis Date    Atrial fibrillation [facility])      Social History     Social History    Marital status: SINGLE     Spouse name: N/A    Number of children: N/A    Years of education: N/A     Occupational History    Not on file.      Social History Main Topics    Smoking status: Never Smoker    Smokeless tobacco: Never Used    Alcohol use 1.8 oz/week     3 Cans of beer per week      Comment: weekends 3 drinks nightly    Drug use: No    Sexual activity: Yes     Other Topics Concern    Not on file     Social History Narrative       Review of Systems:   Constitutional:  lost,  17 lbs. Eyes:  Negative blurred vision  CVS:  Negative chest pain  Pulm:  Positive shortness of breath  GI:  Negative nausea or vomiting  :  Positive nocturia  Musculoskeletal:  Negative significant joint pain at night  Skin:  Negative rashes  Neuro:  Negative dizziness   Psych:  Negative significant mood issues    Sleep Review of Systems: notable for Positive difficulty falling asleep; Positive awakenings at night; Positive percieved regular dreaming; Negative nightmares; Negative early morning headaches; 2 afternoon naps per week; Negative memory problems; Positive concentration issues; Negative history of any automobile or occupational accidents due to daytime drowsiness. Physical Exam    Visit Vitals    /80    Pulse (!) 52    Temp 98.1 °F (36.7 °C) (Oral)    Resp 16    Ht 6' 1\" (1.854 m)    Wt 116.6 kg (257 lb)    SpO2 98%    BMI 33.91 kg/m2       Neck Circumference: 42 cm      General:  Well defined, nourished, and groomed individual in no acute distress. HEENT: head :at/nc Throat:oropharynx  Crowding noted Mallampati 4  Neck: Supple, non tender, thyroid within normal limits, no ANDREZ, no bruits, no pain   with resistance to active range of motion. Heart: Regular rate and rhythm, no murmurs, rub, or gallop. Normal S.D..  Lungs:  Clear to auscultation   Musculoskeletal:  Extremities revealed no edema, clubbing or cyanosis. Psych:  Good mood and normal affect    Neurologic Exam    Mental Status: The patient is awake, alert, and oriented x 3. Speech is fluent and memory appears to be intact, both long and short term. No aphasias or apraxias. Cranial Nerves: II - Visual fields are full to confrontation. Pupils are both equal and reactive to light and accommodation. III, IV, VI - Extraocular movements are intact and there is no nystagmus. V - Facial sensation is intact to pinprick and light touch.   VII - Face is symmetrical.   VIII - Hearing is present. Motor: Tone is normal and symmetric. There is no pronator drift present. The strength is intact for all muscle groups tested in all four extremities. Coordination. Gait testing is normal       psg reviewed as showing overall mild JESSICA becoming moderately severe in REM sleep with oxygen desaturations down to 74% download reviewed in hpi  Assessment and Plan  Matthew King is a 33 y.o. right handed male whose history and physical are consistent with insomnia with jessica. Matthew King who has risk factors including a fib ,witnessed apnea,snoring? RLS    Diagnoses and all orders for this visit:    1. JESSICA (obstructive sleep apnea)    2. Paroxysmal atrial fibrillation (HCC)    3. Insomnia w/ sleep apnea    4. Snoring    5. BMI 33.0-33.9,adult      Follow-up Disposition:  Return in about 6 months (around 4/20/2018). Reviewed psg   Reviewed work up,no driving drowsy I spent 30 minutes with the patient in face-to-face consultation, of which greater than 50% was spent in counseling and coordination of care as described above.         Electronically Signed by:  Sayda Frausto MD

## 2017-10-20 NOTE — MR AVS SNAPSHOT
Visit Information Date & Time Provider Department Dept. Phone Encounter #  
 10/20/2017 11:00 AM Debby Morrison MD [address] at [address] [facility] [id number] Follow-up Instructions Return in about 6 months (around 4/20/2018). Follow-up and Disposition History Your Appointments 4/11/2018 11:00 AM  
Follow Up with Debby Morrison MD  
[address] at [address] [facility] Appt Note: 6 month fu  Need Download [address] [facility] [address] Amadou Damir [address] 200 [facility] Upcoming Health Maintenance Date Due INFLUENZA AGE 9 TO ADULT 8/1/2017 DTaP/Tdap/Td series (2 - Td) 7/27/2027 Allergies as of 10/20/2017  Review Complete On: 10/20/2017 By: Nadir Anderson LPN No Known Allergies Current Immunizations  Never Reviewed Name Date Tdap 7/27/2017 Not reviewed this visit You Were Diagnosed With   
  
 Codes Comments JESSICA (obstructive sleep apnea)    -  Primary ICD-10-CM: G47.33 
ICD-9-CM: 327.23 Paroxysmal atrial fibrillation (HCC)     ICD-10-CM: I48.0 ICD-9-CM: 427.31 Insomnia w/ sleep apnea     ICD-10-CM: G47.00, G47.30 ICD-9-CM: 780.51 Snoring     ICD-10-CM: R06.83 
ICD-9-CM: 786.09 BMI 33.0-33.9,adult     ICD-10-CM: W80.11 
ICD-9-CM: V85.33 Vitals BP Pulse Temp Resp Height(growth percentile) Weight(growth percentile) 140/80 (!) 52 98.1 °F (36.7 °C) (Oral) 16 6' 1\" (1.854 m) 257 lb (116.6 kg) SpO2 BMI Smoking Status 98% 33.91 kg/m2 Never Smoker BMI and BSA Data Body Mass Index Body Surface Area  
 33.91 kg/m 2 2.45 m 2 Preferred Pharmacy Pharmacy Name Phone [address] [phone] Your Updated Medication List  
  
Notice  As of 10/20/2017 11:52 AM  
 You have not been prescribed any medications. Follow-up Instructions Return in about 6 months (around 4/20/2018). Patient Instructions No driving drowsy ,need download before rtc Introducing [facility] SERVICES! Dear Benedict : Thank you for requesting a Atlassian account. Our records indicate that you already have an active Atlassian account. You can access your account anytime at [email or web address] Did you know that you can access your hospital and ER discharge instructions at any time in Atlassian? You can also review all of your test results from your hospital stay or ER visit. Additional Information If you have questions, please visit the Frequently Asked Questions section of the Atlassian website at [email or web address] Remember, Atlassian is NOT to be used for urgent needs. For medical emergencies, dial 911. Now available from your iPhone and Android! Please provide this summary of care documentation to your next provider. Your primary care clinician is listed as Kimberly Damon. If you have any questions after today's visit, please call [phone].

## 2018-04-02 ENCOUNTER — OFFICE VISIT (OUTPATIENT)
Dept: INTERNAL MEDICINE CLINIC | Age: 33
End: 2018-04-02

## 2018-04-02 VITALS
RESPIRATION RATE: 15 BRPM | HEIGHT: 73 IN | HEART RATE: 76 BPM | OXYGEN SATURATION: 98 % | WEIGHT: 249 LBS | DIASTOLIC BLOOD PRESSURE: 66 MMHG | BODY MASS INDEX: 33 KG/M2 | SYSTOLIC BLOOD PRESSURE: 144 MMHG | TEMPERATURE: 98.6 F

## 2018-04-02 DIAGNOSIS — R03.0 ELEVATED BLOOD PRESSURE READING: ICD-10-CM

## 2018-04-02 DIAGNOSIS — E78.5 HYPERLIPIDEMIA, UNSPECIFIED HYPERLIPIDEMIA TYPE: ICD-10-CM

## 2018-04-02 DIAGNOSIS — R07.89 CHEST PAIN, MUSCULOSKELETAL: Primary | ICD-10-CM

## 2018-04-02 DIAGNOSIS — R73.01 IFG (IMPAIRED FASTING GLUCOSE): ICD-10-CM

## 2018-04-02 RX ORDER — DILTIAZEM HYDROCHLORIDE 30 MG/1
30 TABLET, FILM COATED ORAL
COMMUNITY
Start: 2017-09-14

## 2018-04-02 RX ORDER — NAPROXEN 500 MG/1
500 TABLET ORAL 2 TIMES DAILY WITH MEALS
Qty: 60 TAB | Refills: 0 | Status: SHIPPED | OUTPATIENT
Start: 2018-04-02

## 2018-04-02 NOTE — PROGRESS NOTES
HISTORY OF PRESENT ILLNESS  Beto Hahn is a 29 y.o. male. HPI Comments: 30 yo male here for evaluation of intermittent chest pain x 3 months. He notes sx started after weight lifting and feeling 'pop' Denies acute pain with this. Pain will be located L lateral chest wall, sternum and is fleeting. Still able to do his usual activities. Not associated with exertion. Has already seen his cardiologist.   Discussed past labs. Has FH of DM, thyroid disease. Has been working on weight loss. BP is a little increased today. Review of Systems   Constitutional: Negative for chills, fever and weight loss. HENT: Negative for congestion and ear pain. Eyes: Negative for blurred vision and pain. Respiratory: Negative for cough and shortness of breath. Cardiovascular: Positive for chest pain. Negative for palpitations and leg swelling. Gastrointestinal: Negative for nausea and vomiting. Genitourinary: Negative for frequency and urgency. Musculoskeletal: Negative for joint pain and myalgias. Skin: Negative for itching and rash. Neurological: Negative for dizziness, tingling and headaches. Psychiatric/Behavioral: Negative for depression. The patient is not nervous/anxious. Past Medical History:   Diagnosis Date    Atrial fibrillation ([facility] [address]      No current outpatient prescriptions on file prior to visit. No current facility-administered medications on file prior to visit. Social History   Substance Use Topics    Smoking status: Never Smoker    Smokeless tobacco: Never Used    Alcohol use 1.8 oz/week     3 Cans of beer per week      Comment: weekends 3 drinks nightly     Physical Exam   Constitutional: He appears well-developed and well-nourished. No distress. /66  Pulse 76  Temp 98.6 °F (37 °C) (Oral)   Resp 15  Ht 6' 1\" (1.854 m)  Wt 249 lb (112.9 kg)  SpO2 98%  BMI 32.85 kg/m2     Eyes: EOM are normal. Right eye exhibits no discharge.  Left eye exhibits no discharge. No scleral icterus. Neck: Neck supple. Cardiovascular: Normal rate, regular rhythm and normal heart sounds. Exam reveals no gallop and no friction rub. No murmur heard. Pulmonary/Chest: Effort normal and breath sounds normal. No respiratory distress. He has no wheezes. He has no rales. Musculoskeletal: He exhibits no edema or tenderness. Lymphadenopathy:     He has no cervical adenopathy. Neurological: He is alert. He exhibits normal muscle tone. Skin: Skin is warm and dry. Psychiatric: He has a normal mood and affect. Lab Results   Component Value Date/Time    Sodium 139 01/27/2017 01:16 PM    Potassium 4.2 01/27/2017 01:16 PM    Chloride 99 01/27/2017 01:16 PM    CO2 24 01/27/2017 01:16 PM    Glucose 107 (H) 01/27/2017 01:16 PM    BUN 12 01/27/2017 01:16 PM    Creatinine 0.87 01/27/2017 01:16 PM    BUN/Creatinine ratio 14 01/27/2017 01:16 PM    GFR est  01/27/2017 01:16 PM    GFR est non- 01/27/2017 01:16 PM    Calcium 9.5 01/27/2017 01:16 PM    Bilirubin, total 0.3 01/27/2017 01:16 PM    AST (SGOT) 32 01/27/2017 01:16 PM    Alk. phosphatase 70 01/27/2017 01:16 PM    Protein, total 7.2 01/27/2017 01:16 PM    Albumin 4.6 01/27/2017 01:16 PM    A-G Ratio 1.8 01/27/2017 01:16 PM    ALT (SGPT) 50 (H) 01/27/2017 01:16 PM     Lab Results   Component Value Date/Time    WBC 5.9 01/27/2017 01:16 PM    HGB 14.8 01/27/2017 01:16 PM    HCT 43.3 01/27/2017 01:16 PM    PLATELET 961 43/09/0235 01:16 PM    MCV 86 01/27/2017 01:16 PM     ASSESSMENT and PLAN    ICD-10-CM ICD-9-CM    1. Chest pain, musculoskeletal R07.89 786.59    2. IFG (impaired fasting glucose) R73.01 790.21 HEMOGLOBIN A1C WITH EAG      HEMOGLOBIN A1C WITH EAG   3. BMI 32.0-32.9,adult Z68.32 V85.32 TSH 3RD GENERATION      T4, FREE      TSH 3RD GENERATION      T4, FREE   4.  Hyperlipidemia, unspecified hyperlipidemia type E78.5 272.4 CBC W/O DIFF      METABOLIC PANEL, COMPREHENSIVE      LIPID PANEL      CBC W/O DIFF METABOLIC PANEL, COMPREHENSIVE      LIPID PANEL   5. Elevated blood pressure reading R03.0 796.2      Will try naprosyn BID. Could consider muscle relaxant if this is not effective. Massage prn  Repeat labs today. Continue to work on weight loss efforts. Monitor BP at home.

## 2018-04-02 NOTE — PATIENT INSTRUCTIONS

## 2018-04-02 NOTE — PROGRESS NOTES
ROOM # [id number] [facility] Edgardo Barnhart presents today for   Chief Complaint   Patient presents with    Chest Pain     pt sees cardiology he stated EKG was already done Dr Mahan Mouse    Back Pain     muscle tightnesss       Philipp Pena preferred language for health care discussion is english/other. Is someone accompanying this pt? no    Is the patient using any DME equipment during OV? no    Depression Screening:  PHQ over the last two weeks 7/27/2017 1/27/2017   Little interest or pleasure in doing things Not at all Several days   Feeling down, depressed or hopeless Not at all Not at all   Total Score PHQ 2 0 1       Learning Assessment:  Learning Assessment 1/27/2017   PRIMARY LEARNER Patient   HIGHEST LEVEL OF EDUCATION - PRIMARY LEARNER  4 YEARS OF COLLEGE   BARRIERS PRIMARY LEARNER NONE   CO-LEARNER CAREGIVER No   PRIMARY LANGUAGE ENGLISH    NEED No   LEARNER PREFERENCE PRIMARY DEMONSTRATION   LEARNING SPECIAL TOPICS no   ANSWERED BY patient   RELATIONSHIP SELF   ASSESSMENT COMMENT none       Abuse Screening:  No flowsheet data found. Fall Risk  No flowsheet data found. Health Maintenance reviewed and discussed per provider. Yes    No hm due at this time    Advance Directive:  1. Do you have an advance directive in place? Patient Reply: no    2. If not, would you like material regarding how to put one in place? Patient Reply: no    Coordination of Care:  1. Have you been to the ER, urgent care clinic since your last visit? Hospitalized since your last visit? no    2. Have you seen or consulted any other health care providers outside of the [address] [place] [address] since your last visit? Include any pap smears or colon screening.  no

## 2018-04-02 NOTE — MR AVS SNAPSHOT
[address] [facility] 
 
 
 [address] [address] 
[phone] Patient: Nicole Coyle MRN: [id number] NTO:[birth year] Visit Information Date & Time Provider Department Dept. Phone Encounter #  
 4/2/2018  3:15 PM [address] [phone] [id number] Follow-up Instructions Return if symptoms worsen or fail to improve. Your Appointments 4/11/2018 11:00 AM  
Follow Up with Charlesetta Carrel, MD  
[facility] at [address] [facility]) Appt Note: 6 month fu  Need Download [address] [facility] [address] [address] [address] [facility] 630 [facility] [address] 200 [facility] Upcoming Health Maintenance Date Due DTaP/Tdap/Td series (2 - Td) 7/27/2027 Allergies as of 4/2/2018  Review Complete On: 4/2/2018 By: Michael Ward LPN No Known Allergies Current Immunizations  Never Reviewed Name Date Tdap 7/27/2017 Not reviewed this visit You Were Diagnosed With   
  
 Codes Comments Chest pain, musculoskeletal    -  Primary ICD-10-CM: R07.89 ICD-9-CM: 786.59 IFG (impaired fasting glucose)     ICD-10-CM: R73.01 
ICD-9-CM: 790.21 BMI 32.0-32.9,adult     ICD-10-CM: A03.42 
ICD-9-CM: V85.32 Hyperlipidemia, unspecified hyperlipidemia type     ICD-10-CM: E78.5 ICD-9-CM: 272.4 Vitals BP Pulse Temp Resp Height(growth percentile) Weight(growth percentile) 144/66 76 98.6 °F (37 °C) (Oral) 15 6' 1\" (1.854 m) 249 lb (112.9 kg) SpO2 BMI Smoking Status 98% 32.85 kg/m2 Never Smoker BMI and BSA Data Body Mass Index Body Surface Area  
 32.85 kg/m 2 2.41 m 2 Preferred Pharmacy Pharmacy Name Phone 1500 [facility], [address] [place] [address] [phone] Your Updated Medication List  
  
   
 This list is accurate as of 4/2/18  3:45 PM.  Always use your most recent med list.  
  
  
  
  
 dilTIAZem 30 mg tablet Commonly known as:  CARDIZEM 30 mg. FLUCELVAX QUAD 8924-7663 (PF) Syrg injection Generic drug:  influenza vaccine 2017-18 (4 yrs+)(PF)  
inject 0.5 milliliter intramuscularly  
  
 naproxen 500 mg tablet Commonly known as:  NAPROSYN Take 1 Tab by mouth two (2) times daily (with meals). Prescriptions Sent to Pharmacy Refills  
 naproxen (NAPROSYN) 500 mg tablet 0 Sig: Take 1 Tab by mouth two (2) times daily (with meals). Class: Normal  
 Pharmacy: [address], [address] [place] [address]. Ph #: [phone] Route: Oral  
  
Follow-up Instructions Return if symptoms worsen or fail to improve. To-Do List   
 04/02/2018 Lab:  CBC W/O DIFF   
  
 04/02/2018 Lab:  HEMOGLOBIN A1C WITH EAG   
  
 04/02/2018 Lab:  LIPID PANEL   
  
 04/02/2018 Lab:  METABOLIC PANEL, COMPREHENSIVE   
  
 04/02/2018 Lab:  T4, FREE   
  
 04/02/2018 Lab:  TSH 3RD GENERATION Patient Instructions Musculoskeletal Chest Pain: Care Instructions Your Care Instructions Chest pain is not always a sign that something is wrong with your heart or that you have another serious problem. The doctor thinks your chest pain is caused by strained muscles or ligaments, inflamed chest cartilage, or another problem in your chest, rather than by your heart. You may need more tests to find the cause of your chest pain. Follow-up care is a key part of your treatment and safety. Be sure to make and go to all appointments, and call your doctor if you are having problems. It's also a good idea to know your test results and keep a list of the medicines you take. How can you care for yourself at home? · Take pain medicines exactly as directed. ¨ If the doctor gave you a prescription medicine for pain, take it as prescribed. ¨ If you are not taking a prescription pain medicine, ask your doctor if you can take an over-the-counter medicine. · Rest and protect the sore area. · Stop, change, or take a break from any activity that may be causing your pain or soreness. · Put ice or a cold pack on the sore area for 10 to 20 minutes at a time. Try to do this every 1 to 2 hours for the next 3 days (when you are awake) or until the swelling goes down. Put a thin cloth between the ice and your skin. · After 2 or 3 days, apply a heating pad set on low or a warm cloth to the area that hurts. Some doctors suggest that you go back and forth between hot and cold. · Do not wrap or tape your ribs for support. This may cause you to take smaller breaths, which could increase your risk of lung problems. · Mentholated creams such as Bengay or Icy Hot may soothe sore muscles. Follow the instructions on the package. · Follow your doctor's instructions for exercising. · Gentle stretching and massage may help you get better faster. Stretch slowly to the point just before pain begins, and hold the stretch for at least 15 to 30 seconds. Do this 3 or 4 times a day. Stretch just after you have applied heat. · As your pain gets better, slowly return to your normal activities. Any increased pain may be a sign that you need to rest a while longer. When should you call for help? Call 911 anytime you think you may need emergency care. For example, call if: 
? · You have chest pain or pressure. This may occur with: ¨ Sweating. ¨ Shortness of breath. ¨ Nausea or vomiting. ¨ Pain that spreads from the chest to the neck, jaw, or one or both shoulders or arms. ¨ Dizziness or lightheadedness. ¨ A fast or uneven pulse. After calling 911, chew 1 adult-strength aspirin. Wait for an ambulance. Do not try to drive yourself. ? · You have sudden chest pain and shortness of breath, or you cough up blood. ?Call your doctor now or seek immediate medical care if: ? · You have any trouble breathing. ? · Your chest pain gets worse. ? · Your chest pain occurs consistently with exercise and is relieved by rest. ? Watch closely for changes in your health, and be sure to contact your doctor if: 
? · Your chest pain does not get better after 1 week. Where can you learn more? Go to http://markel-latrice.info/. Enter V293 in the search box to learn more about \"Musculoskeletal Chest Pain: Care Instructions. \" Current as of: March 20, 2017 Content Version: 11.4 © 0105-4474 [employer]. Care instructions adapted under license by [employer] (which disclaims liability or warranty for this information). If you have questions about a medical condition or this instruction, always ask your healthcare professional. [address] any warranty or liability for your use of this information. Introducing [facility] & HEALTH SERVICES! Dear Ted Gutierrez: Thank you for requesting a Doctor Fun account. Our records indicate that you already have an active Doctor Fun account. You can access your account anytime at [email or web address] Did you know that you can access your hospital and ER discharge instructions at any time in Doctor Fun? You can also review all of your test results from your hospital stay or ER visit. Additional Information If you have questions, please visit the Frequently Asked Questions section of the Doctor Fun website at [email or web address] Remember, Doctor Fun is NOT to be used for urgent needs. For medical emergencies, dial 911. Now available from your iPhone and Android! Please provide this summary of care documentation to your next provider. Your primary care clinician is listed as Kimberly Damon. If you have any questions after today's visit, please call [phone].

## 2018-04-03 LAB
A-G RATIO,AGRAT: 1.8 RATIO (ref 1.1–2.6)
ALBUMIN SERPL-MCNC: 4.8 G/DL (ref 3.5–5)
ALP SERPL-CCNC: 67 U/L (ref 25–115)
ALT SERPL-CCNC: 30 U/L (ref 5–40)
ANION GAP SERPL CALC-SCNC: 18 MMOL/L
AST SERPL W P-5'-P-CCNC: 28 U/L (ref 10–37)
AVG GLU, 10930: 105 MG/DL (ref 91–123)
BILIRUB SERPL-MCNC: 0.4 MG/DL (ref 0.2–1.2)
BUN SERPL-MCNC: 12 MG/DL (ref 6–22)
CALCIUM SERPL-MCNC: 9.5 MG/DL (ref 8.4–10.4)
CHLORIDE SERPL-SCNC: 96 MMOL/L (ref 98–110)
CHOLEST SERPL-MCNC: 235 MG/DL (ref 110–200)
CO2 SERPL-SCNC: 25 MMOL/L (ref 20–32)
CREAT SERPL-MCNC: 0.8 MG/DL (ref 0.5–1.2)
ERYTHROCYTE [DISTWIDTH] IN BLOOD BY AUTOMATED COUNT: 13.5 % (ref 10–15.5)
GFRAA, 66117: >60
GFRNA, 66118: >60
GLOBULIN,GLOB: 2.7 G/DL (ref 2–4)
GLUCOSE SERPL-MCNC: 85 MG/DL (ref 70–99)
HBA1C MFR BLD HPLC: 5.3 % (ref 4.8–5.9)
HCT VFR BLD AUTO: 45.3 % (ref 36.6–51.9)
HDLC SERPL-MCNC: 4.4 MG/DL (ref 0–5)
HDLC SERPL-MCNC: 53 MG/DL (ref 40–59)
HGB BLD-MCNC: 14.8 G/DL (ref 13.2–17.3)
LDLC SERPL CALC-MCNC: 172 MG/DL (ref 50–99)
MCH RBC QN AUTO: 29 PG (ref 26–34)
MCHC RBC AUTO-ENTMCNC: 33 G/DL (ref 31–36)
MCV RBC AUTO: 90 FL (ref 80–95)
PLATELET # BLD AUTO: 306 K/UL (ref 140–440)
PMV BLD AUTO: 10.1 FL (ref 9–13)
POTASSIUM SERPL-SCNC: 4.2 MMOL/L (ref 3.5–5.5)
PROT SERPL-MCNC: 7.5 G/DL (ref 6.4–8.3)
RBC # BLD AUTO: 5.06 M/UL (ref 3.8–5.8)
SODIUM SERPL-SCNC: 139 MMOL/L (ref 133–145)
T4 FREE SERPL-MCNC: 1.1 NG/DL (ref 0.9–1.8)
TRIGL SERPL-MCNC: 52 MG/DL (ref 40–149)
TSH SERPL DL<=0.005 MIU/L-ACNC: 2.3 MCU/ML (ref 0.27–4.2)
VLDLC SERPL CALC-MCNC: 10 MG/DL (ref 8–30)
WBC # BLD AUTO: 8.6 K/UL (ref 4–11)

## 2018-05-22 ENCOUNTER — OFFICE VISIT (OUTPATIENT)
Dept: INTERNAL MEDICINE CLINIC | Age: 33
End: 2018-05-22

## 2018-05-22 VITALS
HEIGHT: 73 IN | SYSTOLIC BLOOD PRESSURE: 138 MMHG | TEMPERATURE: 97.2 F | HEART RATE: 82 BPM | WEIGHT: 246.2 LBS | DIASTOLIC BLOOD PRESSURE: 93 MMHG | BODY MASS INDEX: 32.63 KG/M2 | OXYGEN SATURATION: 99 % | RESPIRATION RATE: 18 BRPM

## 2018-05-22 DIAGNOSIS — G44.229 CHRONIC TENSION-TYPE HEADACHE, NOT INTRACTABLE: Primary | ICD-10-CM

## 2018-05-22 DIAGNOSIS — R03.0 ELEVATED BLOOD PRESSURE READING: ICD-10-CM

## 2018-05-22 NOTE — PROGRESS NOTES
HISTORY OF PRESENT ILLNESS  Blaine Cruz is a 29 y.o. male. HPI Comments: 28 yo male here for evaluation of HA x 3 months. HA are located temporal, occipital regions. Daily, but not severe. Describes as bandlike. Has not tried any medication for this. Had been making dietary changes including cutting out caffeine, restricting carbs. Tried drinking coffee but did not notice much difference. Denies n/v, vision changes. BP has been elevated recently. Similar readings when checked at home. Has diltiazem but only takes if needed for a fib  BMI 32. Continues to work on weight loss efforts. Review of Systems   Constitutional: Negative for chills, fever and weight loss. HENT: Negative for congestion and ear pain. Eyes: Negative for blurred vision and pain. Respiratory: Negative for cough and shortness of breath. Cardiovascular: Negative for chest pain, palpitations and leg swelling. Gastrointestinal: Negative for nausea and vomiting. Genitourinary: Negative for frequency and urgency. Musculoskeletal: Negative for joint pain and myalgias. Skin: Negative for itching and rash. Neurological: Positive for headaches. Negative for dizziness and tingling. Psychiatric/Behavioral: Negative for depression. The patient is not nervous/anxious. Past Medical History:   Diagnosis Date    Atrial fibrillation [facility])      Current Outpatient Prescriptions on File Prior to Visit   Medication Sig Dispense Refill    dilTIAZem (CARDIZEM) 30 mg tablet 30 mg.      naproxen (NAPROSYN) 500 mg tablet Take 1 Tab by mouth two (2) times daily (with meals). 60 Tab 0     No current facility-administered medications on file prior to visit.       Social History   Substance Use Topics    Smoking status: Never Smoker    Smokeless tobacco: Never Used    Alcohol use 1.8 oz/week     3 Cans of beer per week      Comment: weekends 3 drinks nightly     Physical Exam   Constitutional: He appears well-developed and well-nourished. No distress. BP (!) 138/93 (BP 1 Location: Left arm)  Pulse 82  Temp 97.2 °F (36.2 °C) (Oral)   Resp 18  Ht 6' 1\" (1.854 m)  Wt 246 lb 3.2 oz (111.7 kg)  SpO2 99%  BMI 32.48 kg/m2     HENT:   Head: Normocephalic and atraumatic. Eyes: EOM are normal. Pupils are equal, round, and reactive to light. Right eye exhibits no discharge. Left eye exhibits no discharge. No scleral icterus. Neck: Neck supple. Cardiovascular: Normal rate, regular rhythm and normal heart sounds. Exam reveals no gallop and no friction rub. No murmur heard. Pulmonary/Chest: Effort normal and breath sounds normal. No respiratory distress. He has no wheezes. He has no rales. Musculoskeletal: He exhibits no edema or tenderness. Lymphadenopathy:     He has no cervical adenopathy. Neurological: He is alert. He exhibits normal muscle tone. Skin: Skin is warm and dry. Psychiatric: He has a normal mood and affect. Lab Results   Component Value Date/Time    Sodium 139 04/02/2018 03:46 PM    Potassium 4.2 04/02/2018 03:46 PM    Chloride 96 (L) 04/02/2018 03:46 PM    CO2 25 04/02/2018 03:46 PM    Anion gap 18.0 04/02/2018 03:46 PM    Glucose 85 04/02/2018 03:46 PM    BUN 12 04/02/2018 03:46 PM    Creatinine 0.8 04/02/2018 03:46 PM    BUN/Creatinine ratio 14 01/27/2017 01:16 PM    GFR est  01/27/2017 01:16 PM    GFR est non- 01/27/2017 01:16 PM    Calcium 9.5 04/02/2018 03:46 PM    Bilirubin, total 0.4 04/02/2018 03:46 PM    AST (SGOT) 28 04/02/2018 03:46 PM    Alk.  phosphatase 67 04/02/2018 03:46 PM    Protein, total 7.5 04/02/2018 03:46 PM    Albumin 4.8 04/02/2018 03:46 PM    Globulin 2.7 04/02/2018 03:46 PM    A-G Ratio 1.8 04/02/2018 03:46 PM    ALT (SGPT) 30 04/02/2018 03:46 PM     Lab Results   Component Value Date/Time    WBC 8.6 04/02/2018 03:46 PM    HGB 14.8 04/02/2018 03:46 PM    HCT 45.3 04/02/2018 03:46 PM    PLATELET 668 87/23/8075 03:46 PM    MCV 90 04/02/2018 03:46 PM ASSESSMENT and PLAN    ICD-10-CM ICD-9-CM    1. Chronic tension-type headache, not intractable G44.229 339.12    2. Elevated blood pressure reading R03.0 796.2    3. BMI 32.0-32.9,adult Z68.32 V85.32 REFERRAL TO DIETITIAN   Suspect KERN MSK in nature. Discussed trying prn nsaids. Provided info on tension HA on AVS.   Discussed that he could try taking diltiazem daily for next 1-2 weeks and monitor BP and HA sx. Referral entered to dietitian for assistance with weight loss. Recommend he not cut carbs completely from his diet.

## 2018-05-22 NOTE — PROGRESS NOTES
ROOM # [id number]  Identified pt with two pt identifiers(name and ). Reviewed record in preparation for visit and have obtained necessary documentation. Chief Complaint   Patient presents with    Headache      Ruthy Zepeda preferred language for health care discussion is english/other. Is the patient using any DME equipment during OV? NO    Otf Qureshi is due for: There are no preventive care reminders to display for this patient. Health Maintenance reviewed and discussed per provider  Please order/place referral if appropriate. Advance Directive:  1. Do you have an advance directive in place? Patient Reply: NO    2. If not, would you like material regarding how to put one in place? NO    Coordination of Care:  1. Have you been to the ER, urgent care clinic since your last visit? Hospitalized since your last visit? NO    2. Have you seen or consulted any other health care providers outside of the [facility] since your last visit? Include any pap smears or colon screening. NO    Patient is accompanied by self I have received verbal consent from Ruthy Zepeda to discuss any/all medical information while they are present in the room. Learning Assessment:  Learning Assessment 2017   PRIMARY LEARNER Patient   HIGHEST LEVEL OF EDUCATION - PRIMARY LEARNER  4 YEARS OF COLLEGE   BARRIERS PRIMARY LEARNER NONE   CO-LEARNER CAREGIVER No   PRIMARY LANGUAGE ENGLISH    NEED No   LEARNER PREFERENCE PRIMARY DEMONSTRATION   LEARNING SPECIAL TOPICS no   ANSWERED BY patient   RELATIONSHIP SELF   ASSESSMENT COMMENT none     Depression Screening:  PHQ over the last two weeks 2018   Little interest or pleasure in doing things Not at all Not at all Several days   Feeling down, depressed or hopeless Not at all Not at all Not at all   Total Score PHQ 2 0 0 1     Abuse Screening:  No flowsheet data found. Fall Risk  No flowsheet data found.

## 2018-05-22 NOTE — PATIENT INSTRUCTIONS
Tension Headache: Care Instructions  Your Care Instructions  Most headaches are tension headaches. These headaches tend to happen again, especially if you are under stress. A tension headache may cause pain or a feeling of pressure all over your head. You probably can't pinpoint the center of the pain. If you keep getting tension headaches, the best thing you can do to limit them is to find out what is causing them and then make changes in those areas. Follow-up care is a key part of your treatment and safety. Be sure to make and go to all appointments, and call your doctor if you are having problems. It's also a good idea to know your test results and keep a list of the medicines you take. How can you care for yourself at home? · Rest in a quiet, dark room with a cool cloth on your forehead until your headache is gone. Close your eyes, and try to relax or go to sleep. Don't watch TV or read. Avoid using the computer. · Use a warm, moist towel or a heating pad set on low to relax tight shoulder and neck muscles. · Have someone gently massage your neck and shoulders. · Take pain medicines exactly as directed. ¨ If the doctor gave you a prescription medicine for pain, take it as prescribed. ¨ If you are not taking a prescription pain medicine, ask your doctor if you can take an over-the-counter medicine. · Be careful not to take pain medicine more often than the instructions allow, because you may get worse or more frequent headaches when the medicine wears off. · If you get another tension headache, stop what you are doing and sit quietly for a moment. Close your eyes and breathe slowly. Try to relax your head and neck muscles. · Do not ignore new symptoms that occur with a headache, such as fever, weakness or numbness, vision changes, or confusion. These may be signs of a more serious problem. To help prevent headaches  · Keep a headache diary so you can figure out what triggers your headaches. Avoiding triggers may help you prevent headaches. Record when each headache began, how long it lasted, and what the pain was like (throbbing, aching, stabbing, or dull). List anything that may have triggered the headache, such as being physically or emotionally stressed or being anxious or depressed. Other possible triggers are hunger, anger, fatigue, poor posture, and muscle strain. · Find healthy ways to deal with stress. Headaches are most common during or right after stressful times. Take time to relax before and after you do something that has caused a headache in the past.  · Exercise daily to relieve stress. Relaxation exercises may help reduce tension. · Get plenty of sleep. · Eat regularly and well. Long periods without food can trigger a headache. · Treat yourself to a massage. Some people find that massages are very helpful in relieving tension. · Try to keep your muscles relaxed by keeping good posture. Check your jaw, face, neck, and shoulder muscles for tension, and try to relax them. When sitting at a desk, change positions often, and stretch for 30 seconds each hour. · Reduce eyestrain from computers by blinking frequently and looking away from the computer screen every so often. Make sure you have proper eyewear and that your monitor is set up properly, about an arm's length away. When should you call for help? Call 911 anytime you think you may need emergency care. For example, call if:  ? · You have signs of a stroke. These may include:  ¨ Sudden numbness, paralysis, or weakness in your face, arm, or leg, especially on only one side of your body. ¨ Sudden vision changes. ¨ Sudden trouble speaking. ¨ Sudden confusion or trouble understanding simple statements. ¨ Sudden problems with walking or balance. ¨ A sudden, severe headache that is different from past headaches. ?Call your doctor now or seek immediate medical care if:  ? · You have new or worse nausea and vomiting.    ? · You have a new or higher fever. ? · Your headache gets much worse. ? Watch closely for changes in your health, and be sure to contact your doctor if:  ? · You are not getting better after 2 days (48 hours). Where can you learn more? Go to http://markel-latrice.info/. Enter 84 17 19 in the search box to learn more about \"Tension Headache: Care Instructions. \"  Current as of: October 14, 2016  Content Version: 11.4  © 1206-8022 [employer]. Care instructions adapted under license by [employer] (which disclaims liability or warranty for this information). If you have questions about a medical condition or this instruction, always ask your healthcare professional. [address] any warranty or liability for your use of this information.

## 2018-05-22 NOTE — MR AVS SNAPSHOT
[facility] 
 
 
 [address] [facility] [address] [address] 
[phone] Patient: Alida Figueroa MRN: [id number] HLQ:[id number] Visit Information Date & Time Provider Department Dept. Phone Encounter #  
 5/22/2018  3:00 PM Ashu Jenkins [employer] [phone] [id number] Your Appointments 7/23/2018  9:45 AM  
Follow Up with Julee Barr MD  
[address] [place] [address]) Appt Note: 6 month fu  Need Download; p/t r/s to this time and date 4/3 bdc; p/t r/s to this time and date bdc  
 [address] [address] [phone]  
  
   
 [place] [id number] Upcoming Health Maintenance Date Due Influenza Age 5 to Adult 8/1/2018 DTaP/Tdap/Td series (2 - Td) 7/27/2027 Allergies as of 5/22/2018  Review Complete On: 5/22/2018 By: Henrry Contreras No Known Allergies Current Immunizations  Never Reviewed Name Date Tdap 7/27/2017 Not reviewed this visit You Were Diagnosed With   
  
 Codes Comments Chronic tension-type headache, not intractable    -  Primary ICD-10-CM: T49.928 ICD-9-CM: 339.12 Elevated blood pressure reading     ICD-10-CM: R03.0 ICD-9-CM: 796.2 BMI 32.0-32.9,adult     ICD-10-CM: Y89.88 
ICD-9-CM: V85.32 Vitals BP Pulse Temp Resp Height(growth percentile) Weight(growth percentile) (!) 138/93 (BP 1 Location: Left arm) 82 97.2 °F (36.2 °C) (Oral) 18 6' 1\" (1.854 m) 246 lb 3.2 oz (111.7 kg) SpO2 BMI Smoking Status 99% 32.48 kg/m2 Never Smoker Vitals History BMI and BSA Data Body Mass Index Body Surface Area  
 32.48 kg/m 2 2.4 m 2 Preferred Pharmacy Pharmacy Name Phone [address] [place] [address] [phone] Your Updated Medication List  
  
   
 This list is accurate as of 5/22/18  3:25 PM.  Always use your most recent med list.  
  
  
  
  
 dilTIAZem 30 mg tablet Commonly known as:  CARDIZEM 30 mg.  
  
 naproxen 500 mg tablet Commonly known as:  NAPROSYN Take 1 Tab by mouth two (2) times daily (with meals). We Performed the Following REFERRAL TO DIETITIAN [GCV85 Custom] Comments:  
 Please evaluate patient for assistance with dietary changes, weight loss. Referral Information Referral ID Referred By Referred To  
  
 [id number] [facility]   
   [address] [address] Enzo Cornejo Phone: [phone] Visits Status Start Date End Date 1 New Request 5/22/18 5/22/19 If your referral has a status of pending review or denied, additional information will be sent to support the outcome of this decision. Patient Instructions Tension Headache: Care Instructions Your Care Instructions Most headaches are tension headaches. These headaches tend to happen again, especially if you are under stress. A tension headache may cause pain or a feeling of pressure all over your head. You probably can't pinpoint the center of the pain. If you keep getting tension headaches, the best thing you can do to limit them is to find out what is causing them and then make changes in those areas. Follow-up care is a key part of your treatment and safety. Be sure to make and go to all appointments, and call your doctor if you are having problems. It's also a good idea to know your test results and keep a list of the medicines you take. How can you care for yourself at home? · Rest in a quiet, dark room with a cool cloth on your forehead until your headache is gone. Close your eyes, and try to relax or go to sleep. Don't watch TV or read. Avoid using the computer. · Use a warm, moist towel or a heating pad set on low to relax tight shoulder and neck muscles. · Have someone gently massage your neck and shoulders. · Take pain medicines exactly as directed. ¨ If the doctor gave you a prescription medicine for pain, take it as prescribed. ¨ If you are not taking a prescription pain medicine, ask your doctor if you can take an over-the-counter medicine. · Be careful not to take pain medicine more often than the instructions allow, because you may get worse or more frequent headaches when the medicine wears off. · If you get another tension headache, stop what you are doing and sit quietly for a moment. Close your eyes and breathe slowly. Try to relax your head and neck muscles. · Do not ignore new symptoms that occur with a headache, such as fever, weakness or numbness, vision changes, or confusion. These may be signs of a more serious problem. To help prevent headaches · Keep a headache diary so you can figure out what triggers your headaches. Avoiding triggers may help you prevent headaches. Record when each headache began, how long it lasted, and what the pain was like (throbbing, aching, stabbing, or dull). List anything that may have triggered the headache, such as being physically or emotionally stressed or being anxious or depressed. Other possible triggers are hunger, anger, fatigue, poor posture, and muscle strain. · Find healthy ways to deal with stress. Headaches are most common during or right after stressful times. Take time to relax before and after you do something that has caused a headache in the past. 
· Exercise daily to relieve stress. Relaxation exercises may help reduce tension. · Get plenty of sleep. · Eat regularly and well. Long periods without food can trigger a headache. · Treat yourself to a massage. Some people find that massages are very helpful in relieving tension. · Try to keep your muscles relaxed by keeping good posture. Check your jaw, face, neck, and shoulder muscles for tension, and try to relax them. When sitting at a desk, change positions often, and stretch for 30 seconds each hour. · Reduce eyestrain from computers by blinking frequently and looking away from the computer screen every so often. Make sure you have proper eyewear and that your monitor is set up properly, about an arm's length away. When should you call for help? Call 911 anytime you think you may need emergency care. For example, call if: 
? · You have signs of a stroke. These may include: 
¨ Sudden numbness, paralysis, or weakness in your face, arm, or leg, especially on only one side of your body. ¨ Sudden vision changes. ¨ Sudden trouble speaking. ¨ Sudden confusion or trouble understanding simple statements. ¨ Sudden problems with walking or balance. ¨ A sudden, severe headache that is different from past headaches. ?Call your doctor now or seek immediate medical care if: 
? · You have new or worse nausea and vomiting. ? · You have a new or higher fever. ? · Your headache gets much worse. ? Watch closely for changes in your health, and be sure to contact your doctor if: 
? · You are not getting better after 2 days (48 hours). Where can you learn more? Go to http://markel-latrice.info/. Enter 84 17 85 in the search box to learn more about \"Tension Headache: Care Instructions. \" Current as of: October 14, 2016 Content Version: 11.4 © 6262-7107 [employer]. Care instructions adapted under license by [employer] (which disclaims liability or warranty for this information). If you have questions about a medical condition or this instruction, always ask your healthcare professional. [place] [address] any warranty or liability for your use of this information. Introducing [facility] & HE[facility]! Dear Harrison Garcia: Thank you for requesting a THE [employer]OCKLIST account. Our records indicate that you already have an active THE NOCKLIST account.   You can access your account anytime at [email or web address] [email or web address] Did you know that you can access your hospital and ER discharge instructions at any time in OLSET? You can also review all of your test results from your hospital stay or ER visit. Additional Information If you have questions, please visit the Frequently Asked Questions section of the OLSET website at [email or web address]/. Remember, OLSET is NOT to be used for urgent needs. For medical emergencies, dial 911. Now available from your iPhone and Android! Please provide this summary of care documentation to your next provider. Your primary care clinician is listed as Kimberly Damon. If you have any questions after today's visit, please call [phone].

## 2018-05-29 ENCOUNTER — HOSPITAL ENCOUNTER (OUTPATIENT)
Dept: NUTRITION | Age: 33
Discharge: HOME | End: 2018-05-29
Payer: COMMERCIAL

## 2018-05-29 PROCEDURE — 97802 MEDICAL NUTRITION INDIV IN: CPT

## 2018-05-29 NOTE — PROGRESS NOTES
[address] [facility]  [address] [facility], [place], [place] [address]  Phone: [phone]  Fax: [phone]   Nutrition Assessment  Medical Nutrition Therapy   Outpatient Initial Evaluation         Patient Name: Kathryn Lux : 1985   Treatment Diagnosis: BMI 32-32.9   Referral Source: Benigno Nichole MD Start of Care [facility]): 2018     Gender: male Age: 29 y.o. Ht: 73 in Wt: 243  lb  kg   BMI: 32.1 BMR   Male 2106 BMR Female      Past Medical History includes: Headaches, borderline HLD & HTN     Pertinent Medications:   none     Biochemical Data:   Lab Results   Component Value Date/Time    Hemoglobin A1c 5.3 2018 03:46 PM     Lab Results   Component Value Date/Time    Sodium 139 2018 03:46 PM    Potassium 4.2 2018 03:46 PM    Chloride 96 (L) 2018 03:46 PM    CO2 25 2018 03:46 PM    Anion gap 18.0 2018 03:46 PM    Glucose 85 2018 03:46 PM    BUN 12 2018 03:46 PM    Creatinine 0.8 2018 03:46 PM    BUN/Creatinine ratio 14 2017 01:16 PM    GFR est  2017 01:16 PM    GFR est non- 2017 01:16 PM    Calcium 9.5 2018 03:46 PM    Bilirubin, total 0.4 2018 03:46 PM    AST (SGOT) 28 2018 03:46 PM    Alk. phosphatase 67 2018 03:46 PM    Protein, total 7.5 2018 03:46 PM    Albumin 4.8 2018 03:46 PM    Globulin 2.7 2018 03:46 PM    A-G Ratio 1.8 2018 03:46 PM    ALT (SGPT) 30 2018 03:46 PM     Lab Results   Component Value Date/Time    Cholesterol, total 235 (H) 2018 03:46 PM    HDL Cholesterol 53 2018 03:46 PM    LDL, calculated 172 (H) 2018 03:46 PM    VLDL, calculated 10 2018 03:46 PM    Triglyceride 52 2018 03:46 PM          Subjective/Assessment:   28 yo male referred for help with healthy diet and wt loss. Pt has been trying a couple different diets since 2018 with the goal of wt loss.  The diets described sound too restrictive and not maintainable. He also reports symptoms of headaches which started about the same time as he started dieting. Pt states he was exercising 5 days a week, but he started to feel like it was too much and didn't want to harm his body. Now he is doing resistance training and low intensity cardio 2x per week. He is open to suggestions for a more balanced and maintainable approach. He is also interested in prevention of HLD, HTN, and cardiovascular disease. Current Eating Patterns: Pt has been following low carb diet since Feb. 2018. He has switched between high protein and high fat. Currently states he is following the keto diet, but still eats fruits and plenty of protein. His meal timing looks good. He drinks enough water to prevent feeling thirsty, but doesn't know about how many oz per day. Estimate Needs   Calories: 2000  Protein: 150 Carbs: 200 Fat: 67   Kcal/day  g/day  g/day  g/day        percent: 30  40  30               Education & Recommendations provided: Educated pt on lean protein, non-starchy vegetable and carbohydrate food sources; counting carbohydrates, label reading, meal timing, and appropriate serving sizes. Also, discussed the hallmarks of the keto diet and why cutting out entire food groups completely is not ideal.  Encouraged pt to focus on balanced plate model and carbohydrate goal ranges.     Handouts Provided: [x]  Carbohydrates  [x]  Protein  [x]  Non-starchy Vegatbles  [x]  Food Label  []  Meal and Snack Ideas  []  Food Journals []  Diabetes  [x]  Cholesterol  []  Sodium  []  Gen Nutr Guidelines  []  SBGM Guidelines  [x]  Others: Facts about Fats   Information Reviewed with: pt   Readiness to Change Stage: []  Pre-contemplative    []  Contemplative  []  Preparation               [x]  Action                  []  Maintenance   Potential Barriers to Learning: []  Decline in memory    []  Language barrier   []  Other:  []  Emotional                  [] Limited mobility  []  Lack of motivation     [] Vision, hearing or cognitive impairment   Expected Compliance: Good      Nutritional Goal - To promote lifestyle changes to result in:    [x]  Weight loss  []  Improved diabetic control  [x]  Decreased cholesterol levels  [x]  Decreased blood pressure  []  Weight maintenance []  Preventing any interactions associated with food allergies  []  Adequate weight gain toward goal weight  []  Other:        Patient Goals:   1. Try to have something to eat within 2 hours of waking up. 2. Pair carbohydrates with protein whenever possible. Focus on whole grain carbohydrates. 3. Start looking at grams of total carbs and try to stay between 40-50g per meal and 20-30g/snack. Dietitian Signature: Josh Jeff MS, RD Date: 5/29/2018   Follow-up: Tues.  6/26/18 at 1:30pm Time: 1:06 PM

## 2018-06-26 ENCOUNTER — APPOINTMENT (OUTPATIENT)
Dept: NUTRITION | Age: 33
End: 2018-06-26

## 2018-07-16 ENCOUNTER — TELEPHONE (OUTPATIENT)
Dept: NEUROLOGY | Age: 33
End: 2018-07-16

## 2018-07-16 ENCOUNTER — DOCUMENTATION ONLY (OUTPATIENT)
Dept: NEUROLOGY | Age: 33
End: 2018-07-16

## 2018-07-16 NOTE — TELEPHONE ENCOUNTER
CPAP Usage Report received from [facility] [address] in Dr. Buck Morgan' folder @ [address] [place] [address]

## 2018-07-16 NOTE — PROGRESS NOTES
Chart review for upcoming office visit to discuss JESSICA and CPAP compliance; download requested from [address]..

## 2023-11-28 NOTE — TELEPHONE ENCOUNTER
[employer] message sent.     Patient is scheduled for sleep study 9/7/17. Insurance is not approving titration and requires an auto-bipap. The number to call is [phone]. Please advise.